# Patient Record
Sex: FEMALE | Race: WHITE | NOT HISPANIC OR LATINO | Employment: OTHER | URBAN - METROPOLITAN AREA
[De-identification: names, ages, dates, MRNs, and addresses within clinical notes are randomized per-mention and may not be internally consistent; named-entity substitution may affect disease eponyms.]

---

## 2021-09-13 ENCOUNTER — HOSPITAL ENCOUNTER (INPATIENT)
Facility: CLINIC | Age: 61
LOS: 3 days | Discharge: HOME | End: 2021-09-16
Attending: EMERGENCY MEDICINE | Admitting: HOSPITALIST
Payer: MEDICAID

## 2021-09-13 DIAGNOSIS — N63.0 BREAST MASS IN FEMALE: ICD-10-CM

## 2021-09-13 DIAGNOSIS — D64.9 ANEMIA, UNSPECIFIED TYPE: ICD-10-CM

## 2021-09-13 LAB
ABO/RH(D): NORMAL
ALBUMIN SERPL-MCNC: 2.4 G/DL (ref 3.4–5)
ALP SERPL-CCNC: 83 U/L (ref 40–150)
ALT SERPL W P-5'-P-CCNC: 21 U/L (ref 0–50)
ANION GAP SERPL CALCULATED.3IONS-SCNC: 7 MMOL/L (ref 3–14)
ANTIBODY SCREEN: NEGATIVE
AST SERPL W P-5'-P-CCNC: 22 U/L (ref 0–45)
BASOPHILS # BLD AUTO: 0 10E3/UL (ref 0–0.2)
BASOPHILS NFR BLD AUTO: 0 %
BILIRUB SERPL-MCNC: 0.3 MG/DL (ref 0.2–1.3)
BLD PROD TYP BPU: NORMAL
BLOOD COMPONENT TYPE: NORMAL
BUN SERPL-MCNC: 10 MG/DL (ref 7–30)
CALCIUM SERPL-MCNC: 9.6 MG/DL (ref 8.5–10.1)
CHLORIDE BLD-SCNC: 103 MMOL/L (ref 94–109)
CO2 SERPL-SCNC: 22 MMOL/L (ref 20–32)
CODING SYSTEM: NORMAL
CREAT SERPL-MCNC: 0.59 MG/DL (ref 0.52–1.04)
CROSSMATCH: NORMAL
EOSINOPHIL # BLD AUTO: 0.1 10E3/UL (ref 0–0.7)
EOSINOPHIL NFR BLD AUTO: 1 %
ERYTHROCYTE [DISTWIDTH] IN BLOOD BY AUTOMATED COUNT: 16.3 % (ref 10–15)
GFR SERPL CREATININE-BSD FRML MDRD: >90 ML/MIN/1.73M2
GLUCOSE BLD-MCNC: 152 MG/DL (ref 70–99)
HCT VFR BLD AUTO: 17.9 % (ref 35–47)
HGB BLD-MCNC: 4.7 G/DL (ref 11.7–15.7)
IMM GRANULOCYTES # BLD: 0 10E3/UL
IMM GRANULOCYTES NFR BLD: 1 %
ISSUE DATE AND TIME: NORMAL
LACTATE SERPL-SCNC: 1.2 MMOL/L (ref 0.7–2)
LYMPHOCYTES # BLD AUTO: 0.7 10E3/UL (ref 0.8–5.3)
LYMPHOCYTES NFR BLD AUTO: 10 %
MCH RBC QN AUTO: 17.9 PG (ref 26.5–33)
MCHC RBC AUTO-ENTMCNC: 26.3 G/DL (ref 31.5–36.5)
MCV RBC AUTO: 68 FL (ref 78–100)
MONOCYTES # BLD AUTO: 0.8 10E3/UL (ref 0–1.3)
MONOCYTES NFR BLD AUTO: 13 %
NEUTROPHILS # BLD AUTO: 5 10E3/UL (ref 1.6–8.3)
NEUTROPHILS NFR BLD AUTO: 75 %
NRBC # BLD AUTO: 0 10E3/UL
NRBC BLD AUTO-RTO: 0 /100
NT-PROBNP SERPL-MCNC: 530 PG/ML (ref 0–900)
PLATELET # BLD AUTO: 576 10E3/UL (ref 150–450)
POTASSIUM BLD-SCNC: 4 MMOL/L (ref 3.4–5.3)
PROT SERPL-MCNC: 6.5 G/DL (ref 6.8–8.8)
RBC # BLD AUTO: 2.63 10E6/UL (ref 3.8–5.2)
SODIUM SERPL-SCNC: 132 MMOL/L (ref 133–144)
SPECIMEN EXPIRATION DATE: NORMAL
TROPONIN I SERPL-MCNC: <0.015 UG/L (ref 0–0.04)
UNIT ABO/RH: NORMAL
UNIT NUMBER: NORMAL
UNIT STATUS: NORMAL
UNIT TYPE ISBT: 6200
WBC # BLD AUTO: 6.6 10E3/UL (ref 4–11)

## 2021-09-13 PROCEDURE — 83605 ASSAY OF LACTIC ACID: CPT | Performed by: EMERGENCY MEDICINE

## 2021-09-13 PROCEDURE — 36415 COLL VENOUS BLD VENIPUNCTURE: CPT | Performed by: EMERGENCY MEDICINE

## 2021-09-13 PROCEDURE — 120N000001 HC R&B MED SURG/OB

## 2021-09-13 PROCEDURE — 83880 ASSAY OF NATRIURETIC PEPTIDE: CPT | Performed by: EMERGENCY MEDICINE

## 2021-09-13 PROCEDURE — 0HQ5XZZ REPAIR CHEST SKIN, EXTERNAL APPROACH: ICD-10-PCS | Performed by: SURGERY

## 2021-09-13 PROCEDURE — 36430 TRANSFUSION BLD/BLD COMPNT: CPT

## 2021-09-13 PROCEDURE — 84484 ASSAY OF TROPONIN QUANT: CPT | Performed by: EMERGENCY MEDICINE

## 2021-09-13 PROCEDURE — 85025 COMPLETE CBC W/AUTO DIFF WBC: CPT | Performed by: EMERGENCY MEDICINE

## 2021-09-13 PROCEDURE — C9803 HOPD COVID-19 SPEC COLLECT: HCPCS

## 2021-09-13 PROCEDURE — 86901 BLOOD TYPING SEROLOGIC RH(D): CPT | Performed by: EMERGENCY MEDICINE

## 2021-09-13 PROCEDURE — P9016 RBC LEUKOCYTES REDUCED: HCPCS | Performed by: EMERGENCY MEDICINE

## 2021-09-13 PROCEDURE — 99291 CRITICAL CARE FIRST HOUR: CPT | Mod: 25

## 2021-09-13 PROCEDURE — 87635 SARS-COV-2 COVID-19 AMP PRB: CPT | Performed by: EMERGENCY MEDICINE

## 2021-09-13 PROCEDURE — 99223 1ST HOSP IP/OBS HIGH 75: CPT | Mod: AI | Performed by: HOSPITALIST

## 2021-09-13 PROCEDURE — 80053 COMPREHEN METABOLIC PANEL: CPT | Performed by: EMERGENCY MEDICINE

## 2021-09-13 PROCEDURE — 86923 COMPATIBILITY TEST ELECTRIC: CPT | Performed by: EMERGENCY MEDICINE

## 2021-09-13 PROCEDURE — 85610 PROTHROMBIN TIME: CPT | Performed by: EMERGENCY MEDICINE

## 2021-09-13 PROCEDURE — 99292 CRITICAL CARE ADDL 30 MIN: CPT | Mod: 25

## 2021-09-13 RX ORDER — IBUPROFEN, ACETAMINOPHEN 250; 125 MG/1; MG/1
1-2 TABLET, FILM COATED ORAL EVERY 6 HOURS PRN
Status: ON HOLD | COMMUNITY
End: 2021-09-16

## 2021-09-13 RX ORDER — HYDROMORPHONE HYDROCHLORIDE 1 MG/ML
0.2 INJECTION, SOLUTION INTRAMUSCULAR; INTRAVENOUS; SUBCUTANEOUS
Status: DISCONTINUED | OUTPATIENT
Start: 2021-09-13 | End: 2021-09-15

## 2021-09-13 ASSESSMENT — MIFFLIN-ST. JEOR: SCORE: 1218.6

## 2021-09-13 ASSESSMENT — ENCOUNTER SYMPTOMS
WEAKNESS: 1
ABDOMINAL DISTENTION: 1

## 2021-09-13 NOTE — ED TRIAGE NOTES
Patient states was at Urgent Care.  Having extreme difficulty with legs.  Thickening with lower legs & going up.  Now going up.  Hx restless leg syndrome.

## 2021-09-14 ENCOUNTER — APPOINTMENT (OUTPATIENT)
Dept: CARDIOLOGY | Facility: CLINIC | Age: 61
End: 2021-09-14
Attending: HOSPITALIST
Payer: MEDICAID

## 2021-09-14 ENCOUNTER — APPOINTMENT (OUTPATIENT)
Dept: CT IMAGING | Facility: CLINIC | Age: 61
End: 2021-09-14
Attending: HOSPITALIST
Payer: MEDICAID

## 2021-09-14 LAB
ALBUMIN SERPL-MCNC: 1.9 G/DL (ref 3.4–5)
ALP SERPL-CCNC: 65 U/L (ref 40–150)
ALT SERPL W P-5'-P-CCNC: 17 U/L (ref 0–50)
ANION GAP SERPL CALCULATED.3IONS-SCNC: 5 MMOL/L (ref 3–14)
AST SERPL W P-5'-P-CCNC: 19 U/L (ref 0–45)
BASOPHILS # BLD AUTO: 0 10E3/UL (ref 0–0.2)
BASOPHILS NFR BLD AUTO: 1 %
BILIRUB SERPL-MCNC: 1.1 MG/DL (ref 0.2–1.3)
BUN SERPL-MCNC: 10 MG/DL (ref 7–30)
CALCIUM SERPL-MCNC: 9 MG/DL (ref 8.5–10.1)
CHLORIDE BLD-SCNC: 108 MMOL/L (ref 94–109)
CO2 SERPL-SCNC: 22 MMOL/L (ref 20–32)
CREAT SERPL-MCNC: 0.46 MG/DL (ref 0.52–1.04)
EOSINOPHIL # BLD AUTO: 0.1 10E3/UL (ref 0–0.7)
EOSINOPHIL NFR BLD AUTO: 1 %
ERYTHROCYTE [DISTWIDTH] IN BLOOD BY AUTOMATED COUNT: 19.9 % (ref 10–15)
FERRITIN SERPL-MCNC: 19 NG/ML (ref 8–252)
GFR SERPL CREATININE-BSD FRML MDRD: >90 ML/MIN/1.73M2
GLUCOSE BLD-MCNC: 106 MG/DL (ref 70–99)
HCT VFR BLD AUTO: 25.2 % (ref 35–47)
HEMOCCULT STL QL: NEGATIVE
HGB BLD-MCNC: 7.9 G/DL (ref 11.7–15.7)
HGB BLD-MCNC: 8 G/DL (ref 11.7–15.7)
HGB BLD-MCNC: 8.3 G/DL (ref 11.7–15.7)
IMM GRANULOCYTES # BLD: 0 10E3/UL
IMM GRANULOCYTES NFR BLD: 0 %
INR PPP: 1.22 (ref 0.85–1.15)
IRON SATN MFR SERPL: 92 % (ref 15–46)
IRON SERPL-MCNC: 312 UG/DL (ref 35–180)
LVEF ECHO: NORMAL
LYMPHOCYTES # BLD AUTO: 0.4 10E3/UL (ref 0.8–5.3)
LYMPHOCYTES NFR BLD AUTO: 6 %
MAGNESIUM SERPL-MCNC: 1.7 MG/DL (ref 1.6–2.3)
MCH RBC QN AUTO: 24.1 PG (ref 26.5–33)
MCHC RBC AUTO-ENTMCNC: 31.7 G/DL (ref 31.5–36.5)
MCV RBC AUTO: 76 FL (ref 78–100)
MONOCYTES # BLD AUTO: 1 10E3/UL (ref 0–1.3)
MONOCYTES NFR BLD AUTO: 13 %
NEUTROPHILS # BLD AUTO: 5.6 10E3/UL (ref 1.6–8.3)
NEUTROPHILS NFR BLD AUTO: 79 %
NRBC # BLD AUTO: 0 10E3/UL
NRBC BLD AUTO-RTO: 0 /100
NT-PROBNP SERPL-MCNC: 538 PG/ML (ref 0–900)
PHOSPHATE SERPL-MCNC: 2.2 MG/DL (ref 2.5–4.5)
PHOSPHATE SERPL-MCNC: 2.6 MG/DL (ref 2.5–4.5)
PLATELET # BLD AUTO: 422 10E3/UL (ref 150–450)
POTASSIUM BLD-SCNC: 4 MMOL/L (ref 3.4–5.3)
PROT SERPL-MCNC: 5.1 G/DL (ref 6.8–8.8)
RBC # BLD AUTO: 3.32 10E6/UL (ref 3.8–5.2)
SARS-COV-2 RNA RESP QL NAA+PROBE: NEGATIVE
SODIUM SERPL-SCNC: 135 MMOL/L (ref 133–144)
TIBC SERPL-MCNC: 338 UG/DL (ref 240–430)
WBC # BLD AUTO: 7.1 10E3/UL (ref 4–11)

## 2021-09-14 PROCEDURE — 36415 COLL VENOUS BLD VENIPUNCTURE: CPT | Performed by: NURSE PRACTITIONER

## 2021-09-14 PROCEDURE — 88305 TISSUE EXAM BY PATHOLOGIST: CPT | Mod: TC | Performed by: SURGERY

## 2021-09-14 PROCEDURE — 74177 CT ABD & PELVIS W/CONTRAST: CPT

## 2021-09-14 PROCEDURE — 84100 ASSAY OF PHOSPHORUS: CPT

## 2021-09-14 PROCEDURE — 250N000013 HC RX MED GY IP 250 OP 250 PS 637: Performed by: HOSPITALIST

## 2021-09-14 PROCEDURE — 88360 TUMOR IMMUNOHISTOCHEM/MANUAL: CPT | Mod: 26

## 2021-09-14 PROCEDURE — 99233 SBSQ HOSP IP/OBS HIGH 50: CPT | Performed by: HOSPITALIST

## 2021-09-14 PROCEDURE — 85025 COMPLETE CBC W/AUTO DIFF WBC: CPT | Performed by: HOSPITALIST

## 2021-09-14 PROCEDURE — 82272 OCCULT BLD FECES 1-3 TESTS: CPT | Performed by: HOSPITALIST

## 2021-09-14 PROCEDURE — 93308 TTE F-UP OR LMTD: CPT | Mod: 26 | Performed by: INTERNAL MEDICINE

## 2021-09-14 PROCEDURE — 84100 ASSAY OF PHOSPHORUS: CPT | Performed by: NURSE PRACTITIONER

## 2021-09-14 PROCEDURE — 99222 1ST HOSP IP/OBS MODERATE 55: CPT | Performed by: SURGERY

## 2021-09-14 PROCEDURE — 250N000013 HC RX MED GY IP 250 OP 250 PS 637: Performed by: INTERNAL MEDICINE

## 2021-09-14 PROCEDURE — 120N000001 HC R&B MED SURG/OB

## 2021-09-14 PROCEDURE — 88377 M/PHMTRC ALYS ISHQUANT/SEMIQ: CPT | Performed by: SURGERY

## 2021-09-14 PROCEDURE — 88341 IMHCHEM/IMCYTCHM EA ADD ANTB: CPT | Mod: 26

## 2021-09-14 PROCEDURE — 83550 IRON BINDING TEST: CPT | Performed by: HOSPITALIST

## 2021-09-14 PROCEDURE — 93005 ELECTROCARDIOGRAM TRACING: CPT

## 2021-09-14 PROCEDURE — 36415 COLL VENOUS BLD VENIPUNCTURE: CPT | Performed by: HOSPITALIST

## 2021-09-14 PROCEDURE — 250N000011 HC RX IP 250 OP 636: Performed by: HOSPITALIST

## 2021-09-14 PROCEDURE — 93325 DOPPLER ECHO COLOR FLOW MAPG: CPT

## 2021-09-14 PROCEDURE — 88305 TISSUE EXAM BY PATHOLOGIST: CPT | Mod: 26

## 2021-09-14 PROCEDURE — 250N000009 HC RX 250: Performed by: HOSPITALIST

## 2021-09-14 PROCEDURE — 93325 DOPPLER ECHO COLOR FLOW MAPG: CPT | Mod: 26 | Performed by: INTERNAL MEDICINE

## 2021-09-14 PROCEDURE — 84155 ASSAY OF PROTEIN SERUM: CPT | Performed by: HOSPITALIST

## 2021-09-14 PROCEDURE — 99222 1ST HOSP IP/OBS MODERATE 55: CPT | Performed by: INTERNAL MEDICINE

## 2021-09-14 PROCEDURE — 85018 HEMOGLOBIN: CPT | Performed by: HOSPITALIST

## 2021-09-14 PROCEDURE — 99207 PR NO CHARGE LOS: CPT | Performed by: NURSE PRACTITIONER

## 2021-09-14 PROCEDURE — P9016 RBC LEUKOCYTES REDUCED: HCPCS | Performed by: EMERGENCY MEDICINE

## 2021-09-14 PROCEDURE — 83880 ASSAY OF NATRIURETIC PEPTIDE: CPT | Performed by: HOSPITALIST

## 2021-09-14 PROCEDURE — 93321 DOPPLER ECHO F-UP/LMTD STD: CPT | Mod: 26 | Performed by: INTERNAL MEDICINE

## 2021-09-14 PROCEDURE — 0HB5XZX EXCISION OF CHEST SKIN, EXTERNAL APPROACH, DIAGNOSTIC: ICD-10-PCS | Performed by: SURGERY

## 2021-09-14 PROCEDURE — 88342 IMHCHEM/IMCYTCHM 1ST ANTB: CPT | Mod: 26

## 2021-09-14 PROCEDURE — 82947 ASSAY GLUCOSE BLOOD QUANT: CPT | Performed by: HOSPITALIST

## 2021-09-14 PROCEDURE — 93308 TTE F-UP OR LMTD: CPT

## 2021-09-14 PROCEDURE — 83735 ASSAY OF MAGNESIUM: CPT

## 2021-09-14 PROCEDURE — 82728 ASSAY OF FERRITIN: CPT | Performed by: INTERNAL MEDICINE

## 2021-09-14 PROCEDURE — 88377 M/PHMTRC ALYS ISHQUANT/SEMIQ: CPT | Mod: 26 | Performed by: MEDICAL GENETICS

## 2021-09-14 RX ORDER — PROCHLORPERAZINE 25 MG
25 SUPPOSITORY, RECTAL RECTAL EVERY 12 HOURS PRN
Status: DISCONTINUED | OUTPATIENT
Start: 2021-09-14 | End: 2021-09-16 | Stop reason: HOSPADM

## 2021-09-14 RX ORDER — NALOXONE HYDROCHLORIDE 0.4 MG/ML
0.2 INJECTION, SOLUTION INTRAMUSCULAR; INTRAVENOUS; SUBCUTANEOUS
Status: DISCONTINUED | OUTPATIENT
Start: 2021-09-14 | End: 2021-09-16 | Stop reason: HOSPADM

## 2021-09-14 RX ORDER — IOPAMIDOL 755 MG/ML
73 INJECTION, SOLUTION INTRAVASCULAR ONCE
Status: COMPLETED | OUTPATIENT
Start: 2021-09-14 | End: 2021-09-14

## 2021-09-14 RX ORDER — CYCLOBENZAPRINE HCL 5 MG
5 TABLET ORAL 2 TIMES DAILY PRN
Status: DISCONTINUED | OUTPATIENT
Start: 2021-09-14 | End: 2021-09-16 | Stop reason: HOSPADM

## 2021-09-14 RX ORDER — PROCHLORPERAZINE MALEATE 10 MG
10 TABLET ORAL EVERY 6 HOURS PRN
Status: DISCONTINUED | OUTPATIENT
Start: 2021-09-14 | End: 2021-09-16 | Stop reason: HOSPADM

## 2021-09-14 RX ORDER — FERROUS SULFATE 325(65) MG
325 TABLET ORAL DAILY
Status: DISCONTINUED | OUTPATIENT
Start: 2021-09-14 | End: 2021-09-16 | Stop reason: HOSPADM

## 2021-09-14 RX ORDER — FENTANYL CITRATE 50 UG/ML
100 INJECTION, SOLUTION INTRAMUSCULAR; INTRAVENOUS ONCE
Status: DISCONTINUED | OUTPATIENT
Start: 2021-09-14 | End: 2021-09-14

## 2021-09-14 RX ORDER — ACETAMINOPHEN 650 MG/1
650 SUPPOSITORY RECTAL EVERY 6 HOURS PRN
Status: DISCONTINUED | OUTPATIENT
Start: 2021-09-14 | End: 2021-09-16 | Stop reason: HOSPADM

## 2021-09-14 RX ORDER — LIDOCAINE 40 MG/G
CREAM TOPICAL
Status: DISCONTINUED | OUTPATIENT
Start: 2021-09-14 | End: 2021-09-16 | Stop reason: HOSPADM

## 2021-09-14 RX ORDER — NALOXONE HYDROCHLORIDE 0.4 MG/ML
0.4 INJECTION, SOLUTION INTRAMUSCULAR; INTRAVENOUS; SUBCUTANEOUS
Status: DISCONTINUED | OUTPATIENT
Start: 2021-09-14 | End: 2021-09-16 | Stop reason: HOSPADM

## 2021-09-14 RX ORDER — ONDANSETRON 2 MG/ML
4 INJECTION INTRAMUSCULAR; INTRAVENOUS EVERY 6 HOURS PRN
Status: DISCONTINUED | OUTPATIENT
Start: 2021-09-14 | End: 2021-09-16 | Stop reason: HOSPADM

## 2021-09-14 RX ORDER — ACETAMINOPHEN 325 MG/1
650 TABLET ORAL EVERY 6 HOURS PRN
Status: DISCONTINUED | OUTPATIENT
Start: 2021-09-14 | End: 2021-09-16 | Stop reason: HOSPADM

## 2021-09-14 RX ORDER — BISACODYL 10 MG
10 SUPPOSITORY, RECTAL RECTAL DAILY PRN
Status: DISCONTINUED | OUTPATIENT
Start: 2021-09-14 | End: 2021-09-16 | Stop reason: HOSPADM

## 2021-09-14 RX ORDER — POLYETHYLENE GLYCOL 3350 17 G/17G
17 POWDER, FOR SOLUTION ORAL DAILY PRN
Status: DISCONTINUED | OUTPATIENT
Start: 2021-09-14 | End: 2021-09-16 | Stop reason: HOSPADM

## 2021-09-14 RX ORDER — FENTANYL CITRATE 50 UG/ML
50 INJECTION, SOLUTION INTRAMUSCULAR; INTRAVENOUS ONCE
Status: DISCONTINUED | OUTPATIENT
Start: 2021-09-14 | End: 2021-09-16 | Stop reason: HOSPADM

## 2021-09-14 RX ORDER — ONDANSETRON 4 MG/1
4 TABLET, ORALLY DISINTEGRATING ORAL EVERY 6 HOURS PRN
Status: DISCONTINUED | OUTPATIENT
Start: 2021-09-14 | End: 2021-09-16 | Stop reason: HOSPADM

## 2021-09-14 RX ORDER — FENTANYL CITRATE 50 UG/ML
INJECTION, SOLUTION INTRAMUSCULAR; INTRAVENOUS
Status: DISCONTINUED
Start: 2021-09-14 | End: 2021-09-14 | Stop reason: HOSPADM

## 2021-09-14 RX ORDER — ROPINIROLE 0.25 MG/1
0.25 TABLET, FILM COATED ORAL ONCE
Status: COMPLETED | OUTPATIENT
Start: 2021-09-14 | End: 2021-09-14

## 2021-09-14 RX ADMIN — FERROUS SULFATE TAB 325 MG (65 MG ELEMENTAL FE) 325 MG: 325 (65 FE) TAB at 15:10

## 2021-09-14 RX ADMIN — CYCLOBENZAPRINE 5 MG: 5 TABLET, FILM COATED ORAL at 12:19

## 2021-09-14 RX ADMIN — ACETAMINOPHEN 650 MG: 325 TABLET, FILM COATED ORAL at 15:10

## 2021-09-14 RX ADMIN — SODIUM CHLORIDE 62 ML: 9 INJECTION, SOLUTION INTRAVENOUS at 14:19

## 2021-09-14 RX ADMIN — POTASSIUM & SODIUM PHOSPHATES POWDER PACK 280-160-250 MG 1 PACKET: 280-160-250 PACK at 21:26

## 2021-09-14 RX ADMIN — IOPAMIDOL 73 ML: 755 INJECTION, SOLUTION INTRAVENOUS at 14:19

## 2021-09-14 RX ADMIN — ROPINIROLE HYDROCHLORIDE 0.25 MG: 0.25 TABLET, FILM COATED ORAL at 02:57

## 2021-09-14 RX ADMIN — ACETAMINOPHEN 650 MG: 325 TABLET, FILM COATED ORAL at 08:48

## 2021-09-14 RX ADMIN — ACETAMINOPHEN 650 MG: 325 TABLET, FILM COATED ORAL at 21:26

## 2021-09-14 RX ADMIN — POTASSIUM & SODIUM PHOSPHATES POWDER PACK 280-160-250 MG 1 PACKET: 280-160-250 PACK at 15:10

## 2021-09-14 RX ADMIN — CYCLOBENZAPRINE 5 MG: 5 TABLET, FILM COATED ORAL at 21:26

## 2021-09-14 ASSESSMENT — ACTIVITIES OF DAILY LIVING (ADL)
DRESSING/BATHING_DIFFICULTY: NO
DOING_ERRANDS_INDEPENDENTLY_DIFFICULTY: NO
ADLS_ACUITY_SCORE: 17
NUMBER_OF_TIMES_PATIENT_HAS_FALLEN_WITHIN_LAST_SIX_MONTHS: 2
DIFFICULTY_EATING/SWALLOWING: NO
FALL_HISTORY_WITHIN_LAST_SIX_MONTHS: YES
WALKING_OR_CLIMBING_STAIRS_DIFFICULTY: YES
CONCENTRATING,_REMEMBERING_OR_MAKING_DECISIONS_DIFFICULTY: NO
HEARING_DIFFICULTY_OR_DEAF: NO
WHICH_OF_THE_ABOVE_FUNCTIONAL_RISKS_HAD_A_RECENT_ONSET_OR_CHANGE?: FALL HISTORY
ADLS_ACUITY_SCORE: 17
TOILETING_ISSUES: NO
EQUIPMENT_CURRENTLY_USED_AT_HOME: WALKER, ROLLING
VISION_MANAGEMENT: GLASSES
WALKING_OR_CLIMBING_STAIRS: AMBULATION DIFFICULTY, REQUIRES EQUIPMENT
DIFFICULTY_COMMUNICATING: NO
PATIENT_/_FAMILY_COMMUNICATION_STYLE: SPOKEN LANGUAGE (ENGLISH OR BILINGUAL)
ADLS_ACUITY_SCORE: 17
ADLS_ACUITY_SCORE: 18
WERE_AUXILIARY_AIDS_OFFERED?: NO
ADLS_ACUITY_SCORE: 17
WEAR_GLASSES_OR_BLIND: YES

## 2021-09-14 NOTE — PLAN OF CARE
[id number]: Patient alert and oriented, VSS on room air, tele SR. Complains of pain in L breast, prn tylenol effective. L breast wound covered with abd pads, binder in place. Serosanguinous drainage noted, odorous. Pt up independently. Monitoring hemoglobin, last result 8.3. Beside biopsy done today, result pending. Plan for bone scan tomorrow.

## 2021-09-14 NOTE — CONSULTS
Care Management Initial Consult    General Information  Assessment completed with: Susie Nicholas   Type of CM/SW Visit: Initial Assessment    Primary Care Provider verified and updated as needed:     Readmission within the last 30 days: no previous admission in last 30 days      Reason for Consult: financial concerns  Advance Care Planning: Advance Care Planning Reviewed: other (comment) (No docs )          Communication Assessment  Patient's communication style: spoken language (English or Bilingual)    Hearing Difficulty or Deaf: no   Wear Glasses or Blind: yes    Cognitive  Cognitive/Neuro/Behavioral: WDL  Level of Consciousness: alert  Arousal Level: opens eyes spontaneously  Orientation: oriented x 4  Mood/Behavior: calm, cooperative  Best Language: 0 - No aphasia  Speech: clear, spontaneous    Living Environment:   People in home: spouse     Current living Arrangements: apartment      Able to return to prior arrangements:         Family/Social Support:  Care provided by: self  Provides care for: no one  Marital Status:   , Children  Tyler       Description of Support System: Involved, Supportive         Current Resources:   Patient receiving home care services: No     Community Resources: None  Equipment currently used at home: walker, rolling  Supplies currently used at home: None    Employment/Financial:  Employment Status: retired (Pt just retired and reports she would now receieve social [employer])     Employment/ Comments: No  Financial Concerns: other (see comments) (Reports she would pay out-of-pocket for all medical services)   Referral to Financial Counselor: No (Pt declining FC referral currently )  Finance Comments: Pt reports she will pay out-of-pocket and she is waiting for insurance that she has applied for      Lifestyle & Psychosocial Needs:  Social Determinants of Health     Tobacco Use: Unknown     Smoking Tobacco Use: Never Smoker     Smokeless Tobacco Use: Unknown    Alcohol Use:      Frequency of Alcohol Consumption:      Average Number of Drinks:      Frequency of Binge Drinking:    Financial Resource Strain:      Difficulty of Paying Living Expenses:    Food Insecurity:      Worried About Running Out of Food in the Last Year:      Ran Out of Food in the Last Year:    Transportation Needs:      Lack of Transportation (Medical):      Lack of Transportation (Non-Medical):    Physical Activity:      Days of Exercise per Week:      Minutes of Exercise per Session:    Stress:      Feeling of Stress :    Social Connections:      Frequency of Communication with Friends and Family:      Frequency of Social Gatherings with Friends and Family:      Attends Sikhism Services:      Active Member of Clubs or Organizations:      Attends Club or Organization Meetings:      Marital Status:    Intimate Partner Violence:      Fear of Current or Ex-Partner:      Emotionally Abused:      Physically Abused:      Sexually Abused:    Depression:      PHQ-2 Score:    Housing Stability:      Unable to Pay for Housing in the Last Year:      Number of Places Lived in the Last Year:      Unstable Housing in the Last Year:        Functional Status:  Prior to admission patient needed assistance:              Mental Health Status:          Chemical Dependency Status:                Values/Beliefs:  Spiritual, Cultural Beliefs, Sikhism Practices, Values that affect care: no               Additional Information:  SW discussed with pt regarding her lack of insurance. Pt reports she is taking responsibility for her medical bills because she is waiting on insurance from her social security. She reports she will have insurance in two weeks. Pt will currently pay out-of-pocket for her medical bills. Pt has no concerns for paying for her medical bills.     JUAN DAVID Tompkins

## 2021-09-14 NOTE — PROGRESS NOTES
General surgery note     Patient anemic due to bleeding from skin edges of left breast fungating  mass.   This was controlled with sutures. So that patient could have a somewhat more formal evaluation, as even palliative type procedures may prove challenging.

## 2021-09-14 NOTE — PHARMACY-ADMISSION MEDICATION HISTORY
Pharmacy Medication History  Admission medication history interview status for the 9/13/2021  admission is complete. See EPIC admission navigator for prior to admission medications     Location of Interview: Patient room  Medication history sources: Patient, Surescripts and Care Everywhere    Significant changes made to the medication list:  Added all meds    In the past week, patient estimated taking medication this percent of the time: greater than 90%    Additional medication history information:   none    Medication reconciliation completed by provider prior to medication history? No    Time spent in this activity: 5 mins    Prior to Admission medications    Medication Sig Last Dose Taking? Auth Provider   Ibuprofen-Acetaminophen (ADVIL DUAL ACTION) 125-250 MG TABS Take 1-2 tablets by mouth every 6 hours as needed  Yes Unknown, Entered By History       The information provided in this note is only as accurate as the sources available at the time of update(s)

## 2021-09-14 NOTE — PROVIDER NOTIFICATION
MD Notification    Notified Person: MD    Notified Person Name: Opal    Notification Date/Time: 9/14/2021 0300    Notification Interaction: Paged    Purpose of Notification:    Dressing has moderated amount of drainage    Orders Received:    Reinforce dressing with a towel or ABD pad and abdominal binder to help hold pressure     Comments:     Dressing reinforced

## 2021-09-14 NOTE — ED NOTES
DATE:  9/13/2021   TIME OF RECEIPT FROM LAB:  2220  LAB TEST:  Hemoglobin   LAB VALUE:  4.7  RESULTS GIVEN WITH READ-BACK TO (PROVIDER):  Shawna Cuellar MD  TIME LAB VALUE REPORTED TO PROVIDER:   2220

## 2021-09-14 NOTE — ED NOTES
Improved color and mentation after 1st unit of blood. Chest dressing patent; however, there is still a slow flow of blood draining. MD aware.

## 2021-09-14 NOTE — PROGRESS NOTES
[facility]  Hospitalist Progress Note   09/14/2021          Assessment and Plan:       Susie Hernández is a 61 year old woman with restless leg syndrome admitted on 9/13/2021 with leg swelling and fatigue and found to have blood loss anemia      Severe acute symptomatic blood loss anemia suspected due to fungating left breast mass:  Reports oozing from breast mass.  Tachycardic, hemoglobin 4.7.  Received 4 units of blood transfusion.  General surgery following noted to have bleeding from skin edges of left breast fungating mass that was controlled with Sutures.  Will hold off anemia work-up as patient has already received blood transfusion.  Fecal occult blood testing.  CT abdomen and pelvis with and without contrast pending.  [facility] consulted.  Appreciate input.  Monitor hemoglobin levels every 8 hours  Transfuse if hemoglobin less than 7 or symptomatic -conditional order to transfuse placed.    Fungating left breast mass.  Patient reports mass on left breast ongoing for more than a year.  No history of cancer in family members.  No previous age-appropriate health maintenance.  Oncology consulted.  General surgery following, plan for bedside punch biopsy for tissue diagnosis.  Appreciate input.  WOC RN consult   As needed pain meds as needed for pain.    Leg and abdominal distension:  Reports  bilateral lower extremity swelling for few weeks now.  This could be due to compression from as yet occult intra-abdominal mass, vs acute high output diastolic CHF exacerbation due to anemia, vs due to hypoalbuminemia.  TTE pending   As needed IV/p.o. Lasix.     Acute hyponatremia: Mild, 132. Suspect due to hypovolemia, vs SIADH from malignancy.   Monitor closely for now.     Suspected severe protein calorie malnutrition: Hypoalbuminemia from poor oral intake.  Presented with albumin of 2.4.  Nutrition services consulted.     RLS:   Started on trial of Requip [9/13], symptoms improving.  Continues  to complain of intermittent leg cramps, as needed Flexeril ordered.    COVID-19 PCR negative on admission.  Not vaccinated for COVID-19, encouraged to consider vaccination.      Orders Placed This Encounter      NPO for Medical/Clinical Reasons Except for: Meds, Ice Chips      DVT Prophylaxis: SCDs, ambulate.    Code Status: Full Code  Disposition: Expected discharge in greater than 2 days pending clinical improvement.    Discussed with patient, bedside RN, general surgery.  Total time greater than 35 minutes.  More than 60% of time spent in direct patient care, care coordination, patient counseling, and formalizing plan of care.     Miranda Mcdermott MD        Interval History:      Lying in bed.  Continues to complain of discomfort in her left breast area.  Oozing drainage, abdominal binder in place.  Complaining of restless legs.  Some relief of symptoms with Requip overnight.  Denies any chest pain.  Denies any headache or dizziness.  No palpitations.  No nausea vomiting.         Physical Exam:        Physical Exam   Temp:  [96.9  F (36.1  C)-98.6  F (37  C)] 98.3  F (36.8  C)  Pulse:  [] 79  Resp:  [8-27] 16  BP: (101-136)/(59-96) 110/67  SpO2:  [96 %-100 %] 100 %    Intake/Output Summary (Last 24 hours) at 9/14/2021 1335  Last data filed at 9/14/2021 0850  Gross per 24 hour   Intake 1330 ml   Output 200 ml   Net 1130 ml       Admission Weight: 65.8 kg (145 lb)  Current Weight: 65.8 kg (145 lb)    PHYSICAL EXAM  GENERAL: Patient is in no distress. Alert and oriented.  HEART: Regular rate and rhythm. S1S2.  Technically difficult due to breast mass.  Chest wall left breast mass fungating, oozing discharge.  LUNGS: Clear to auscultation bilaterally. No expiratory wheeze.  Respirations unlabored  ABDOMEN: Soft, no abdominal tenderness, bowel sounds heard   NEURO: Moving all extremities.  EXTREMITIES: No pedal edema.   SKIN: Warm, dry.   PSYCHIATRY Cooperative       Medications:          fentaNYL  50 mcg  Intravenous Once     ferrous sulfate  325 mg Oral Daily     potassium & sodium phosphates  1 packet Oral TID     sodium chloride (PF)  3 mL Intracatheter Q8H     acetaminophen **OR** acetaminophen, bisacodyl, cyclobenzaprine, HYDROmorphone, lidocaine 4%, lidocaine (buffered or not buffered), melatonin, naloxone **OR** naloxone **OR** naloxone **OR** naloxone, ondansetron **OR** ondansetron, polyethylene glycol, prochlorperazine **OR** prochlorperazine **OR** prochlorperazine, sodium chloride (PF)         Data:      All new lab and imaging data was reviewed.

## 2021-09-14 NOTE — PLAN OF CARE
A&Ox4, restless at times d/t restless leg syndrome,1 time dose of Requip given with improvement. VSS ex. Tachycardic improving with blood transfusion. Tele: ST. Denies pain. NPO since midnight. Denies nausea. Bedrest. L chest dressing, moderate amount of drainage, abdominal binder applied to help hold pressure on wound. Voiding and 1 BM via bedpan. Last unit of blood transfusing ( 4 units given in total). Day RN to re time hemoglobin check. Continue to monitor.

## 2021-09-14 NOTE — CONSULTS
[facility] General Surgery Consultation    Susie Hernández MRN# [id number]   YOB: 1960 Age: 61 year old      Date of Admission:  9/13/2021  Date of Consult: 9/14/2021         Assessment and Plan:   Patient is a 61 year old female with fungating left breast mass    PLAN:  -Medical management per primary team  -Follow-up recommendations from oncology consult  -Proceed with CT of the chest/abdomen/pelvis to evaluate for metastatic disease  -Will obtain bedside punch biopsy for tissue diagnosis  -No indication for further surgical intervention at this time.  Surgery to follow.         Requesting Physician:              Chief Complaint:     Chief Complaint   Patient presents with     Leg Swelling          History of Present Illness:   Susie Hernández is a 61 year old female who was seen in consultation at the request of  who presented with leg swelling.  The patient reports that she first noticed a small mass measuring a couple of inches in length present over the lower inner aspect of the left breast approximately 2 years ago.  She states that the mass then completely resolved.  Approximately 1 year later, the patient noticed a recurrent left breast mass in the same location.  Over the last 6 months, this mass has significantly increased in size and is now encompassing the patient's entire left breast.  The patient reports that she has been doing twice daily dressing changes for her resulting left breast wound.  The patient denies any significant bleeding associated with her left breast wound.  She did have some bleeding from the wound last night, and stitches were placed to obtain hemostasis by one of my surgical partners.  The patient reports that she has not undergone any previous mammograms.  No previous breast biopsies.  No family history of breast cancer.  She began menarche at the age of 11.  Her first pregnancy was at age 24.  Patient denies use of hormone  "replacement therapy.  She was on oral contraceptives for approximately 10 years.              Physical Exam:   Blood pressure 113/64, pulse 89, temperature 97.8  F (36.6  C), temperature source Oral, resp. rate 17, height 1.651 m (5' 5\"), weight 65.8 kg (145 lb), SpO2 96 %.  145 lbs 0 oz  General: Vital signs reviewed, in no apparent distress  Eyes: Anicteric  HENT: Normocephalic, atraumatic, trachea midline   Respiratory: Breathing nonlabored  Cardiovascular: Regular rate and rhythm  Breast: Large, necrotic, fungating, malodorous mass encompassing the entire left breast; masslike firmness involving the central aspect of the right breast  Lymph: Bulky left axillary lymphadenopathy, no obvious right axillary lymphadenopathy  Musculoskeletal: No gross deformities  Neurologic: Grossly nonfocal exam  Psychiatric: Normal mood, affect and insight  Integumentary: Warm and dry         Past Medical History:     Past Medical History:   Diagnosis Date     Restless legs syndrome (RLS)             Past Surgical History:     Past Surgical History:   Procedure Laterality Date     CHOLECYSTECTOMY  1987            Current Medications:           fentaNYL  50 mcg Intravenous Once     sodium chloride (PF)  3 mL Intracatheter Q8H       acetaminophen **OR** acetaminophen, bisacodyl, HYDROmorphone, lidocaine 4%, lidocaine (buffered or not buffered), melatonin, naloxone **OR** naloxone **OR** naloxone **OR** naloxone, ondansetron **OR** ondansetron, polyethylene glycol, prochlorperazine **OR** prochlorperazine **OR** prochlorperazine, sodium chloride (PF)         Home Medications:     Prior to Admission medications    Medication Sig Last Dose Taking? Auth Provider   Ibuprofen-Acetaminophen (ADVIL DUAL ACTION) 125-250 MG TABS Take 1-2 tablets by mouth every 6 hours as needed  Yes Unknown, Entered By History            Allergies:   No Known Allergies         Family History:     Family History   Problem Relation Age of Onset     Myocardial " Infarction Father 40     Cancer No family hx of            Social History:   Susie Hernández  reports that she has never smoked. She does not have any smokeless tobacco history on file. She reports previous alcohol use.          Review of Systems:   Constitutional: No fevers or chills  Eyes: No blurred or double vision  HENT: Denies headaches, No rhinorrhea, No sore throat  Respiratory: Cough  Cardiovascular: Denies chest pain or palpitations  Gastrointestinal: No abdominal pain or nausea/vomiting  Genitourinary: No hematuria or dysuria  Musculoskeletal: Denies arthralgias or myalgias  Neurologic: Numbness/tingling in lower extremity  Integumentary: No skin rashes         Labs/Imaging   All new lab and imaging data was reviewed.   Recent Labs   Lab 09/14/21  0930 09/13/21  2150   WBC 7.1 6.6   HGB 8.0* 4.7*   HCT 25.2* 17.9*   MCV 76* 68*    576*     Recent Labs   Lab 09/14/21 0930 09/13/21  2150    132*   POTASSIUM 4.0 4.0   CHLORIDE 108 103   CO2 22 22   ANIONGAP 5 7   * 152*   BUN 10 10   CR 0.46* 0.59   GFRESTIMATED >90 >90   CHYNA 9.0 9.6   MAG 1.7  --    PHOS 2.2*  --    PROTTOTAL 5.1* 6.5*   ALBUMIN 1.9* 2.4*   BILITOTAL 1.1 0.3   ALKPHOS 65 83   AST 19 22   ALT 17 21       Razia Godwin MD

## 2021-09-14 NOTE — PROVIDER NOTIFICATION
MD Notification    Notified Person: MD    Notified Person Name: mansoor    Notification Date/Time: 9/14/2021 0319    Notification Interaction: Amcom    Purpose of Notification:    330-1 S.T.    FYI    cardiac murmur noted on assessment    Rhianna Gonzales RN    Orders Received:    No new orders    Comments:

## 2021-09-14 NOTE — H&P
[facility]    History and Physical  Hospitalist       Date of Admission:  9/13/2021  Date of Service (when I saw the patient): 09/13/21    ASSESSMENT  Susie Hernádnez is a markedly pleasant 61 year old woman with past medical history that is most notable for restless leg syndrome who presents with leg swelling and fatigue and is found to have acute blood loss anemia causing syncope suspected due to fungating left breast mass.    PLAN    Severe acute blood loss anemia causing syncope suspected due to fungating left breast mass: the mass continues to ooze blood. She is tachycardic though not hypotensive. Suspect as yet undiagnosed breast cancer. HGB is 4.7. Four units packed red cells have been ordered in the ED and Surgery has been consulted.     -- Inpatient. Telemetry. NPO. Follow up Surgery recommendations closely tonight. HGB checks q 4 hours.     -- Oncology consulted. CT Chest, abdomen and pelvis ordered.    -- Tylenol, anti-emetics as needed    Leg and abdominal distension: This could be due to compression from as yet occult intra-abdominal mass, vs acute high output diastolic CHF exacerbation due to anemia, vs due to hypoalbuminemia.    -- TTE and EKG ordered    -- For any acute dyspnea especially while she is being transfused, would order a careful trial of Lasix    Acute hyponatremia: Mild, 132. Suspect due to hypovolemia, vs SIADH from malignancy. Monitor closely for now.    Suspected severe protein calorie malnutrition: Nutrition services consulted.    RLS: Will try Requip for relief of symptoms once bleeding has stabilized    Rule Out COVID-19 infection  This patient was evaluated during a global COVID-19 pandemic, which necessitated consideration that the patient might be at risk for infection with the SARS-CoV-2 virus that causes COVID-19. Applicable protocols for evaluation were followed during the patient's care. Low suspicion for infection.   -follow up COVID-19 PCR test  "result  -no current indication for precautions    Chief Complaint   Leg swelling    History is obtained from the patient and the ED physician whom I have spoken with    History of Present Illness   Susie Hernández is a markedly pleasant 61 year old woman who presents with leg swelling. This started in the left leg (also the right leg), in the ankles about two weeks ago; the night before it started she had eaten some seafood. The swelling has persisted since onset and spread up the lower legs, and she also feels distension in her abdomen. She has noticed during this time frame worsening of her chronic night time restless legs, as well as fatigue. Her  has gotten her a walker to use with walking because she has to stabilize herself to walk due to the restless legs. At times she has also noticed an associated cough at night that is non-productive and comes on after she has fallen asleep, waking her up. She was seen in an urgent care center and sent in to the ED for further evaluation. In addition, she adds that two years ago, she noticed development of a lump in her left breast that was small, maybe 2 cm or so in size, that resolved on its own. However a year after that she then developed a mass in the left breast that started small and over the last six months has come to spread over the whole breast; it is an open wound she has been dressing herself at home, though she feels it has not been bleeding at home. Due to COVID and the fact she has just retired and has switched insurance carriers she has not yet sought medical attention for it. She otherwise denies any family history of breast or any other cancer, or any dyspnea, weight change, fever, chills, or sweats, or any pain in the breast mass or abdomen or chest, or any other acute complaints.    In the ED, Blood pressure 113/70, pulse 104, temperature 97.6  F (36.4  C), temperature source Temporal, resp. rate 16, height 1.651 m (5' 5\"), weight 65.8 kg (145 " "lb), SpO2 99 %.    CBC and CMP were notable for Na 132, alb 2.4, tprot 6.5, Glucose 152, HGB 4.7, MCV 68, , otherwise were within the normal reference range. BNP was 530, Troponin negative.    While in the ED she developed syncope while walking; 4 units packed red cells have been ordered for urgent transfusion.    PHYSICAL EXAM  Blood pressure 113/70, pulse 104, temperature 97.6  F (36.4  C), temperature source Temporal, resp. rate 16, height 1.651 m (5' 5\"), weight 65.8 kg (145 lb), SpO2 99 %.  Constitutional: Alert and oriented to person, place and time; no apparent distress  HEENT: normocephalic moist mucus membranes  Respiratory: lungs clear to auscultation bilaterally  Cardiovascular: regular S1 S2  GI: abdomen soft non tender non distended bowel sounds positive  Lymph/Hematologic: marked pallor, no evident cervical lymphadenopathy that I can palpate  Skin: extensive fungating mass over the left breast region, oozing blood (see ED provider note for pictures); good turgor  Musculoskeletal: mild to moderate bilateral LE edema  Neurologic: extra-ocular muscles intact; moves all four extremities  Psychiatric: appropriate affect, insight and judgment     DVT Prophylaxis: Pneumatic Compression Devices  Code Status: Full Code    Disposition: Expected discharge in 2-5 days    Harman Irvin MD    Past Medical History    I have reviewed this patient's medical history and updated it with pertinent information if needed.   Past Medical History:   Diagnosis Date     Restless legs syndrome (RLS)        Past Surgical History   I have reviewed this patient's surgical history and updated it with pertinent information if needed.  Past Surgical History:   Procedure Laterality Date     CHOLECYSTECTOMY  1987       Prior to Admission Medications   Prior to Admission Medications   Prescriptions Last Dose Informant Patient Reported? Taking?   Ibuprofen-Acetaminophen (ADVIL DUAL ACTION) 125-250 MG TABS  Self Yes Yes "   Sig: Take 1-2 tablets by mouth every 6 hours as needed      Facility-Administered Medications: None     Allergies   No Known Allergies    Social History   I have reviewed this patient's social history and updated it with pertinent information if needed. Susie Hernández  reports that she has never smoked. She does not have any smokeless tobacco history on file. She reports previous alcohol use.    Family History   Family history assessed and, except as above, is non-contributory.    Family History   Problem Relation Age of Onset     Myocardial Infarction Father 40     Cancer No family hx of        Review of Systems   The 10 point Review of Systems is negative other than noted in the HPI or here.     Primary Care Physician   Kenneth Lentz [facility]    Data   Labs Ordered and Resulted from Time of ED Arrival Up to the Time of Departure from the ED   COMPREHENSIVE METABOLIC PANEL - Abnormal; Notable for the following components:       Result Value    Sodium 132 (*)     Glucose 152 (*)     Protein Total 6.5 (*)     Albumin 2.4 (*)     All other components within normal limits   CBC WITH PLATELETS AND DIFFERENTIAL - Abnormal; Notable for the following components:    RBC Count 2.63 (*)     Hemoglobin 4.7 (*)     Hematocrit 17.9 (*)     MCV 68 (*)     MCH 17.9 (*)     MCHC 26.3 (*)     RDW 16.3 (*)     Platelet Count 576 (*)     Absolute Lymphocytes 0.7 (*)     All other components within normal limits   LACTIC ACID WHOLE BLOOD - Normal   TROPONIN I - Normal   NT PROBNP INPATIENT - Normal   CBC WITH PLATELETS & DIFFERENTIAL    Narrative:     The following orders were created for panel order CBC with platelets differential.  Procedure                               Abnormality         Status                     ---------                               -----------         ------                     CBC with platelets and d...[[id number]]  Abnormal            Final result                 Please view results for these tests on the  individual orders.   TYPE AND SCREEN, ADULT   PREPARE RED BLOOD CELLS (UNIT)   PREPARE RED BLOOD CELLS (UNIT)   PREPARE RED BLOOD CELLS (UNIT)   PREPARE RED BLOOD CELLS (UNIT)   PREPARE RED BLOOD CELLS (UNIT)   PREPARE RED BLOOD CELLS (UNIT)   TRANSFUSE RED BLOOD CELLS (UNIT)   ABO/RH TYPE AND SCREEN    Narrative:     The following orders were created for panel order ABO/Rh type and screen.  Procedure                               Abnormality         Status                     ---------                               -----------         ------                     Adult Type and Screen[[id number]]                            Final result                 Please view results for these tests on the individual orders.       Data reviewed today:  I personally reviewed no images or EKG's today.    No results found for this or any previous visit (from the past 24 hour(s)).

## 2021-09-14 NOTE — CONSULTS
"BRIEF NUTRITION ASSESSMENT      REASON FOR ASSESSMENT:  Susie Hernández is a 61 year old female seen by Registered Dietitian for Provider Order - Suspected severe protein calorie malnutrition     NUTRITION HISTORY:  Visited with patient at bedside this afternoon.  She states that she has not had any reduced appetite or intake prior to admit.  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  She notes that she had cereal and cinnamon raisin toast in the morning.  \"I eat three lite meals per day\".  She may have a turkey sandwich, pasta dishes, or salmon.  Patient notes that she has cut out coffee intake and has been really watching her sodium intake due to her leg edema.    Patient denies any weight loss, states weight has been stable.     CURRENT DIET AND INTAKE:  Diet:  Clear Liquid               Patient has not ordered yet today     ANTHROPOMETRICS:  Height: 5' 5\"  Weight:  65.8 kg (145#)(9/13)  Body mass index is 24.13 kg/m    Weight Status: Normal BMI  IBW:  56.8 kg   %IBW: 116%  Weight History:   Wt Readings from Last 10 Encounters:   09/13/21 65.8 kg (145 lb)     Patient denies weight loss     LABS:  Albumin 1.9 (L) - Albumin is not an evidence based indicator of nutrition status/malnutrition.  Albumin is a negative acute phase protein and will be low in states of inflammation.  Suspecting low albumin is due to blood loss with fungating breast mass rather than malnutrition.     MALNUTRITION:  Visual Nutrition Focused Physical Assessment (NFPA) completed - no muscle/fat losses noted.  Patient does not meet two of the following criteria necessary for diagnosing malnutrition.     % Weight Loss:  None noted  % Intake:  No decreased intake noted  Subcutaneous Fat Loss:  None observed  Muscle Loss:  None observed  Fluid Retention:  Mild 1-2+ in extremities per RN notes     NUTRITION INTERVENTION:  Nutrition Diagnosis:  No nutrition diagnosis at this time.    Implementation:  Nutrition Education:  Per Provider order if " indicated.  Offered patient an oral nutritional supplement to which she has declined at this time.    FOLLOW UP/MONITORING:   Will re-evaluate in 7 - 10 days, or sooner, if re-consulted.    Raisa Pimentel RD, LD, [facility]   Clinical Dietitian - [facility]

## 2021-09-14 NOTE — ED NOTES
[facility]  ED Nurse Handoff Report    ED Chief complaint: Leg Swelling      ED Diagnosis:   Final diagnoses:   Breast mass in female   Anemia, unspecified type       Code Status: Full Code, Admitting MD to confirm.      Allergies: No Known Allergies    Patient Story: Presents with leg swelling. Found to have open wound on her L breast that were hemorrhaging.  Focused Assessment:  Alert and oriented x4. In room air. 22G in R hand, 18G in R AC. 14G in LL arm. MD Opal, suturing bleeding sites on L breast at this time. Color improved after 1st unit of blood, no other syncope episodes after blood unit. Currently on the 3rd unit. Ambulated with a walker at baseline. Sinus tachycardic. Stable pressures.    Labs Ordered and Resulted from Time of ED Arrival Up to the Time of Departure from the ED   COMPREHENSIVE METABOLIC PANEL - Abnormal; Notable for the following components:       Result Value    Sodium 132 (*)     Glucose 152 (*)     Protein Total 6.5 (*)     Albumin 2.4 (*)     All other components within normal limits   CBC WITH PLATELETS AND DIFFERENTIAL - Abnormal; Notable for the following components:    RBC Count 2.63 (*)     Hemoglobin 4.7 (*)     Hematocrit 17.9 (*)     MCV 68 (*)     MCH 17.9 (*)     MCHC 26.3 (*)     RDW 16.3 (*)     Platelet Count 576 (*)     Absolute Lymphocytes 0.7 (*)     All other components within normal limits   INR - Abnormal; Notable for the following components:    INR 1.22 (*)     All other components within normal limits   LACTIC ACID WHOLE BLOOD - Normal   TROPONIN I - Normal   NT PROBNP INPATIENT - Normal   COVID-19 VIRUS (CORONAVIRUS) BY PCR - Normal    Narrative:     Testing was performed using the taryn  SARS-CoV-2 & Influenza A/B Assay on the taryn  Gisela  System.  This test should be ordered for the detection of SARS-COV-2 in individuals who meet SARS-CoV-2 clinical and/or epidemiological criteria. Test performance is unknown in asymptomatic patients.  This  test is for in vitro diagnostic use under the FDA EUA for laboratories certified under CLIA to perform moderate and/or high complexity testing. This test has not been FDA cleared or approved.  A negative test does not rule out the presence of PCR inhibitors in the specimen or target RNA in concentration below the limit of detection for the assay. The possibility of a false negative should be considered if the patient's recent exposure or clinical presentation suggests COVID-19.  [facility] are certified under the Clinical Laboratory Improvement Amendments of 1988 (CLIA-88) as qualified to perform moderate and/or high complexity laboratory testing.   CBC WITH PLATELETS & DIFFERENTIAL    Narrative:     The following orders were created for panel order CBC with platelets differential.  Procedure                               Abnormality         Status                     ---------                               -----------         ------                     CBC with platelets and d...[[id number]]  Abnormal            Final result                 Please view results for these tests on the individual orders.   TYPE AND SCREEN, ADULT   PREPARE RED BLOOD CELLS (UNIT)   PREPARE RED BLOOD CELLS (UNIT)   PREPARE RED BLOOD CELLS (UNIT)   PREPARE RED BLOOD CELLS (UNIT)   PREPARE RED BLOOD CELLS (UNIT)   PREPARE RED BLOOD CELLS (UNIT)   TRANSFUSE RED BLOOD CELLS (UNIT)   TRANSFUSE RED BLOOD CELLS (UNIT)   TRANSFUSE RED BLOOD CELLS (UNIT)   TRANSFUSE RED BLOOD CELLS (UNIT)   ABO/RH TYPE AND SCREEN    Narrative:     The following orders were created for panel order ABO/Rh type and screen.  Procedure                               Abnormality         Status                     ---------                               -----------         ------                     Adult Type and Screen[[id number]]                            Final result                 Please view results for these tests on the individual orders.      No orders to display         Treatments and/or interventions provided: Blood  Patient's response to treatments and/or interventions: Tolerated well    To be done/followed up on inpatient unit:  Continue with plan of care per admitting MD.    Does this patient have any cognitive concerns?: N/A    Activity level - Baseline/Home:  Walker  Activity Level - Current:   Total Care    Patient's Preferred language: English   Needed?: No    Isolation: None  Infection: Not Applicable  Patient tested for COVID 19 prior to admission: YES  Bariatric?: No    Vital Signs:   Vitals:    09/14/21 0030 09/14/21 0043 09/14/21 0045 09/14/21 0100   BP: 130/80 136/84 136/84    Pulse: 114 112 120 114   Resp: 8 12 8 26   Temp: 97.4  F (36.3  C) 97.3  F (36.3  C) 97.3  F (36.3  C) 97.4  F (36.3  C)   TempSrc: Temporal Temporal Temporal Temporal   SpO2: 100% 100% 100% 100%   Weight:       Height:           Cardiac Rhythm:     Was the PSS-3 completed:   Yes  What interventions are required if any?               Family Comments: No family at bedside  OBS brochure/video discussed/provided to patient/family: N/A                  For the majority of the shift this patient's behavior was Green.     ED NURSE PHONE NUMBER: [id number]

## 2021-09-14 NOTE — ED NOTES
Patient up walking in room.  Had a syncopal episode.  In bed & placed in reverse Trendelenburg.  Still restless.  /110.  EDT to go get units of blood from lab.  Dr Cuellar notified.  Patient moved to [facility].

## 2021-09-14 NOTE — PROGRESS NOTES
RECEIVING UNIT ED HANDOFF REVIEW    ED Nurse Handoff Report was reviewed by: Kayli Gutierrez RN on September 14, 2021 at 1:32 AM

## 2021-09-14 NOTE — PROGRESS NOTES
General Surgery Operative Note - Minors Procedure    Pre-Operative Diagnosis- Fungating left breast mass    Post-Operative Diagnosis- Same    Procedure- Left breast punch biopsy    Surgeon- Razia Godwin MD    Assistant- None    Anesthesia- 1% lidocaine with epi    Specimen- left breast (upper inner quadrant) punch biopsy    Procedure  Verbal consent was obtained. The patient was positioned supine. The patient's left upper inner breast was prepped with Chloraprep. Using sterile technique, 1% lidocaine with epinephrine was used to infiltrate the area. After adequate anesthesia was confirmed, an 8mm punch was used to obtain a tissue sample.  It was amputated utilizing the iris scissors.  The specimen was placed directly into formalin.  Minor bleeding was controlled using pressure.   The wound was reapproximated using interrupted 3-0 Prolene sutures.  A sterile dressing was applied.  Patient tolerated the procedure well without complications.       Razia Godwin MD  [facility]  [phone]

## 2021-09-14 NOTE — PROVIDER NOTIFICATION
Text page to Dr. Mcdermott    (pt moved rooms) Can pt have diet? CT says no NPO for scan. Also, pt has been up and moving, ok to keep doing so?

## 2021-09-14 NOTE — ED PROVIDER NOTES
History   Chief Complaint:  Leg Swelling     The history is provided by the patient.      Susie Hernández is a 61 year old female with history of restless leg syndrome who presents from Urgent Care with leg swelling. Patient reports that she has noticed increased leg swelling recently. States the swelling is worsening every day and has radiated into her abdomen. Endorses weakness and skin pallor when questioned as well. Of note, she has a history of restless leg syndrome in both legs.     Later in her ED stay she admits to a breast mass that is now a wound.  She has not seen a doctor for it and has been dressing it with cotton balls and pads and tape.    Review of Systems   Cardiovascular: Positive for leg swelling.   Gastrointestinal: Positive for abdominal distention.   Skin: Positive for pallor.   Neurological: Positive for weakness.   10 systems reviewed and negative except as above and in HPI.    Allergies:  No Known Allergies    Medications:  The patient is currently on no regular medications.    Past Medical History:    Restless leg syndrome    Social History:  Presents with    Patient is     Physical Exam     Patient Vitals for the past 24 hrs:   BP Temp Temp src Pulse Resp SpO2 Height Weight   09/14/21 0043 -- (P) 97.3  F (36.3  C) (P) Tympanic -- -- -- -- --   09/14/21 0030 130/80 97.4  F (36.3  C) Temporal 114 8 100 % -- --   09/14/21 0015 126/70 97.5  F (36.4  C) Temporal 112 13 100 % -- --   09/14/21 0010 -- -- -- 112 17 100 % -- --   09/14/21 0005 135/62 -- -- (!) 126 22 100 % -- --   09/14/21 0000 124/78 97.3  F (36.3  C) Temporal 109 11 100 % -- --   09/13/21 2355 115/69 -- -- 105 27 100 % -- --   09/13/21 2350 (!) 119/92 -- -- 108 20 100 % -- --   09/13/21 2349 -- 98.6  F (37  C) Temporal -- -- -- -- --   09/13/21 2346 -- 98.6  F (37  C) -- -- -- -- -- --   09/13/21 2345 120/69 -- -- 108 25 100 % -- --   09/13/21 2340 120/73 -- -- 99 18 100 % -- --   09/13/21 2335 115/79 -- -- 107 16  "100 % -- --   09/13/21 2330 (!) 107/96 -- -- 111 21 100 % -- --   09/13/21 2325 132/74 96.9  F (36.1  C) Temporal 112 15 100 % -- --   09/13/21 2320 103/74 -- -- 101 15 100 % -- --   09/13/21 2315 115/80 -- -- 99 14 -- -- --   09/13/21 2310 101/74 97.9  F (36.6  C) -- 98 23 100 % -- --   09/13/21 1547 113/70 97.6  F (36.4  C) Temporal 104 16 99 % 1.651 m (5' 5\") 65.8 kg (145 lb)       Physical Exam  General: Pacing in room, profoundly pale, appears uncomfortable  Head:  The scalp, face, and head appear normal  Mouth/Throat: Mucus membranes are moist  CV:  Regular rate    Normal S1 and S2  No pathological murmur   Resp:  Breath sounds clear and equal bilaterally    Non-labored, no retractions or accessory muscle use    No coarseness    No wheezing   GI:  Abdomen is soft, no rigidity    No tenderness to palpation  MS:  . Bilateral lower extremity edema with pitting edema to the mid abdomen.   Skin:  See images below.   Breast:           See images below.  Neuro:  Speech is normal and fluent. No apparent deficit.  Psych: Awake. Alert.  Normal affect.      Appropriate interactions.                  Emergency Department Course   Laboratory:  CBC: WBC 6.6, HGB 4.7 (LL),  (H)  CMP: Glucose 152 (H), Sodium: 132 (L), Albumin: 2.4 (L), Protein Total: 6.5 (L), o/w WNL (Creatinine: 0.59)    Troponin (Collected 2150): <0.015  Lactic acid (Resulted 2224): 1.2  BNP: 530  INR: 1.22 (H)    ABO/Rh Type and Screen: A Pos    Asymptomatic Covid: Pending    Emergency Department Course:    Reviewed:  I reviewed nursing notes, vitals and past medical history    Assessments:  2120 I obtained history and examined the patient as noted above.   2229 I rechecked the patient and explained findings.   2305    Patient moved to [facility] 3 and blood was hung. I received consent for a blood transfusion from the patient.    Consults:   2257 I consulted Dr. Ivrin, of the [facility], regarding the patient.   5002 I talked with Dr. Joseph, of surgery, " regarding the patient.     Interventions:  Transfuse red blood cells, 1 unit(s), IV  Transfuse red blood cells, 1 unit(s), IV  Transfuse red blood cells, 1 unit(s), IV  Transfuse red blood cells, 1 unit(s), IV    Disposition:  The patient was admitted to the hospital under the care of Dr. Irvin.     Impression & Plan   Medical Decision Making:  Susie Hernández is a 61 year old female who presents for evaluation of leg swelling and increased weakness. They are anemic on labs. A broad differential was of course considered, most probable etiologies include anemia of chronic disease, acute blood loss, slow or fast gastrointestinal bleeding, iron deficiency anemia, etc. Given later finding of a briskly bleeding left breast mass (refer to images above) on exam, I feel the most likely etiology of her anemia is acute blood loss, likely on top of anemia of chronic disease given her apparent malignancy.      Based on hgb, I recommended blood transfusion and admission to the hospital. They consent to this. Discussed with medicine team who accepted patient for admission.      While in the ED, the patient had a syncopal episode.  She was emergently transfused 3 units and a pressure dressing was applied ot her breast.  Dr. Joseph graciously came to the ED and evaluated with patient.  While the dressing slowed the bleeding, it has not resolved.  After removal of the dressing the patient had ongoing brisk bleeding.  Dr. Joseph placed several sutures in the stabilization room with good hemostasis.  Pressure dressing reapplied.    Critical care time  100 minutes exclusive of procedures.    Covid-19  Susie Hernández was evaluated during a global COVID-19 pandemic, which necessitated consideration that the patient might be at risk for infection with the SARS-CoV-2 virus that causes COVID-19.   Applicable protocols for evaluation were followed during the patient's care.   COVID-19 was considered as part of the patient's evaluation. The  plan for testing is:  a test was obtained during this visit.     Diagnosis:    ICD-10-CM    1. Breast mass in female  N63.0    2. Anemia, unspecified type  D64.9        Scribe Disclosure:  I, Leydi Heath, am serving as a scribe at 9:19 PM on 9/13/2021 to document services personally performed by Shawna Cuellar MD based on my observations and the provider's statements to me.            Shawna Cuellar MD  09/14/21 0144

## 2021-09-14 NOTE — PROVIDER NOTIFICATION
MD Notification    Notified Person: MD    Notified Person Name: Brandee    Notification Date/Time: 9/14/2021 0201    Notification Interaction: [employer]     Purpose of Notification:    Room 330-2 S.T.    pt. extremely restless.    Thanks,  Rhianna BRAVO    Orders Received:    Requip ordered    Comments:

## 2021-09-14 NOTE — PROVIDER NOTIFICATION
Text page to Dr. Mcdermott     Can pt have another dose of Requip for restless leg? Thank you!    PRN flexeril ordered

## 2021-09-15 ENCOUNTER — APPOINTMENT (OUTPATIENT)
Dept: NUCLEAR MEDICINE | Facility: CLINIC | Age: 61
End: 2021-09-15
Attending: INTERNAL MEDICINE
Payer: MEDICAID

## 2021-09-15 LAB
ALBUMIN SERPL-MCNC: 1.9 G/DL (ref 3.4–5)
ANION GAP SERPL CALCULATED.3IONS-SCNC: 6 MMOL/L (ref 3–14)
BASOPHILS # BLD AUTO: 0 10E3/UL (ref 0–0.2)
BASOPHILS NFR BLD AUTO: 1 %
BUN SERPL-MCNC: 7 MG/DL (ref 7–30)
CALCIUM SERPL-MCNC: 9.3 MG/DL (ref 8.5–10.1)
CHLORIDE BLD-SCNC: 110 MMOL/L (ref 94–109)
CO2 SERPL-SCNC: 22 MMOL/L (ref 20–32)
CREAT SERPL-MCNC: 0.51 MG/DL (ref 0.52–1.04)
EOSINOPHIL # BLD AUTO: 0.1 10E3/UL (ref 0–0.7)
EOSINOPHIL NFR BLD AUTO: 1 %
ERYTHROCYTE [DISTWIDTH] IN BLOOD BY AUTOMATED COUNT: 20.3 % (ref 10–15)
GFR SERPL CREATININE-BSD FRML MDRD: >90 ML/MIN/1.73M2
GLUCOSE BLD-MCNC: 79 MG/DL (ref 70–99)
HCT VFR BLD AUTO: 26 % (ref 35–47)
HGB BLD-MCNC: 7.3 G/DL (ref 11.7–15.7)
HGB BLD-MCNC: 8 G/DL (ref 11.7–15.7)
HGB BLD-MCNC: 8.2 G/DL (ref 11.7–15.7)
HGB BLD-MCNC: 8.2 G/DL (ref 11.7–15.7)
HGB BLD-MCNC: 8.3 G/DL (ref 11.7–15.7)
HGB BLD-MCNC: 8.4 G/DL (ref 11.7–15.7)
IMM GRANULOCYTES # BLD: 0 10E3/UL
IMM GRANULOCYTES NFR BLD: 1 %
LYMPHOCYTES # BLD AUTO: 0.6 10E3/UL (ref 0.8–5.3)
LYMPHOCYTES NFR BLD AUTO: 7 %
MCH RBC QN AUTO: 23.3 PG (ref 26.5–33)
MCHC RBC AUTO-ENTMCNC: 30.8 G/DL (ref 31.5–36.5)
MCV RBC AUTO: 76 FL (ref 78–100)
MONOCYTES # BLD AUTO: 1.3 10E3/UL (ref 0–1.3)
MONOCYTES NFR BLD AUTO: 15 %
NEUTROPHILS # BLD AUTO: 6.7 10E3/UL (ref 1.6–8.3)
NEUTROPHILS NFR BLD AUTO: 75 %
NRBC # BLD AUTO: 0 10E3/UL
NRBC BLD AUTO-RTO: 0 /100
PHOSPHATE SERPL-MCNC: 2.9 MG/DL (ref 2.5–4.5)
PLATELET # BLD AUTO: 429 10E3/UL (ref 150–450)
POTASSIUM BLD-SCNC: 3.9 MMOL/L (ref 3.4–5.3)
RBC # BLD AUTO: 3.43 10E6/UL (ref 3.8–5.2)
SODIUM SERPL-SCNC: 138 MMOL/L (ref 133–144)
WBC # BLD AUTO: 8.8 10E3/UL (ref 4–11)

## 2021-09-15 PROCEDURE — 250N000013 HC RX MED GY IP 250 OP 250 PS 637: Performed by: HOSPITALIST

## 2021-09-15 PROCEDURE — 99231 SBSQ HOSP IP/OBS SF/LOW 25: CPT | Performed by: SURGERY

## 2021-09-15 PROCEDURE — A9503 TC99M MEDRONATE: HCPCS | Performed by: HOSPITALIST

## 2021-09-15 PROCEDURE — 84100 ASSAY OF PHOSPHORUS: CPT | Performed by: HOSPITALIST

## 2021-09-15 PROCEDURE — 250N000013 HC RX MED GY IP 250 OP 250 PS 637: Performed by: INTERNAL MEDICINE

## 2021-09-15 PROCEDURE — 36415 COLL VENOUS BLD VENIPUNCTURE: CPT | Performed by: HOSPITALIST

## 2021-09-15 PROCEDURE — 85018 HEMOGLOBIN: CPT | Performed by: HOSPITALIST

## 2021-09-15 PROCEDURE — G0463 HOSPITAL OUTPT CLINIC VISIT: HCPCS

## 2021-09-15 PROCEDURE — 99232 SBSQ HOSP IP/OBS MODERATE 35: CPT | Performed by: INTERNAL MEDICINE

## 2021-09-15 PROCEDURE — 99233 SBSQ HOSP IP/OBS HIGH 50: CPT | Performed by: HOSPITALIST

## 2021-09-15 PROCEDURE — 78306 BONE IMAGING WHOLE BODY: CPT

## 2021-09-15 PROCEDURE — 82310 ASSAY OF CALCIUM: CPT | Performed by: HOSPITALIST

## 2021-09-15 PROCEDURE — 343N000001 HC RX 343: Performed by: HOSPITALIST

## 2021-09-15 PROCEDURE — 120N000001 HC R&B MED SURG/OB

## 2021-09-15 RX ORDER — FUROSEMIDE 20 MG
20 TABLET ORAL ONCE
Status: COMPLETED | OUTPATIENT
Start: 2021-09-15 | End: 2021-09-15

## 2021-09-15 RX ORDER — METRONIDAZOLE 500 MG/1
500 TABLET ORAL 2 TIMES DAILY
Status: DISCONTINUED | OUTPATIENT
Start: 2021-09-15 | End: 2021-09-16 | Stop reason: HOSPADM

## 2021-09-15 RX ORDER — OXYCODONE HYDROCHLORIDE 5 MG/1
5 TABLET ORAL EVERY 4 HOURS PRN
Status: DISCONTINUED | OUTPATIENT
Start: 2021-09-15 | End: 2021-09-16 | Stop reason: HOSPADM

## 2021-09-15 RX ORDER — TC 99M MEDRONATE 20 MG/10ML
25 INJECTION, POWDER, LYOPHILIZED, FOR SOLUTION INTRAVENOUS ONCE
Status: COMPLETED | OUTPATIENT
Start: 2021-09-15 | End: 2021-09-15

## 2021-09-15 RX ADMIN — OXYCODONE HYDROCHLORIDE 5 MG: 5 TABLET ORAL at 21:49

## 2021-09-15 RX ADMIN — FUROSEMIDE 20 MG: 20 TABLET ORAL at 16:39

## 2021-09-15 RX ADMIN — ACETAMINOPHEN 650 MG: 325 TABLET, FILM COATED ORAL at 08:35

## 2021-09-15 RX ADMIN — ACETAMINOPHEN 650 MG: 325 TABLET, FILM COATED ORAL at 21:49

## 2021-09-15 RX ADMIN — ACETAMINOPHEN 650 MG: 325 TABLET, FILM COATED ORAL at 14:40

## 2021-09-15 RX ADMIN — POTASSIUM & SODIUM PHOSPHATES POWDER PACK 280-160-250 MG 1 PACKET: 280-160-250 PACK at 08:35

## 2021-09-15 RX ADMIN — CYCLOBENZAPRINE 5 MG: 5 TABLET, FILM COATED ORAL at 08:35

## 2021-09-15 RX ADMIN — FERROUS SULFATE TAB 325 MG (65 MG ELEMENTAL FE) 325 MG: 325 (65 FE) TAB at 08:35

## 2021-09-15 RX ADMIN — METRONIDAZOLE 500 MG: 500 TABLET ORAL at 21:31

## 2021-09-15 RX ADMIN — MELATONIN TAB 3 MG 5 MG: 3 TAB at 21:57

## 2021-09-15 RX ADMIN — TC 99M MEDRONATE 27.9 MCI.: 20 INJECTION, POWDER, LYOPHILIZED, FOR SOLUTION INTRAVENOUS at 07:49

## 2021-09-15 RX ADMIN — CYCLOBENZAPRINE 5 MG: 5 TABLET, FILM COATED ORAL at 21:57

## 2021-09-15 ASSESSMENT — ACTIVITIES OF DAILY LIVING (ADL)
ADLS_ACUITY_SCORE: 17

## 2021-09-15 NOTE — CONSULTS
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 Remove old dressings and discard   2. Cleanse wound and periwound skin with normal saline (or showering)   3. Pat dry   4. Crush tablets of Flagyl and apply topically directly to wound bed   5. Cover with adaptic gauze (oil emulsion gauze, non-adherent gauze, different names for the same thing)   6. Then cover with ABD pads   7. Secure with x2 6\" ACE wraps          metroNIDAZOLE (FLAGYL) tablet 500 mg   [[id number]]  Order Details  Ordered Dose: 500 mg Route: Topical Frequency: 2 TIMES DAILY   Admin Dose: 500 mg      Scheduled Start Date/Time: 09/15/21 2100 End Date/Time: -- First Dose: As Scheduled      Admin Instructions:   Crush tablets, apply directly topically to fungating breast tumor BID with dressing changes        Indications of Use: fungating breast tumor       Orders Written  Recommended provider order: None, at this time and spoke to provider in person and received verbal order with readback for flagyl  WO Nurse follow-up plan: weekly  Nursing to notify the Provider(s) and re-consult the [facility] Nurse if wound(s) deteriorates or new skin concern.    Patient History  According to provider note(s):  \" 61-year-old female with large fungating left breast mass.  This is clinically consistent with breast cancer.  Biopsy was done yesterday.  Because of chronic oozing of blood, she also has severe iron deficiency anemia.\"    Objective Data  Containment of urine/stool: Continent of bladder and Continent of bowel    Active Diet Order:  Orders Placed This Encounter      Regular Diet Adult    Output:   I/O last 3 completed shifts:  In: 430   Out: 200 [Urine:200]    Risk " "Assessment:   Sensory Perception: 4-->no impairment  Moisture: 3-->occasionally moist  Activity: 3-->walks occasionally  Mobility: 3-->slightly limited  Nutrition: 3-->adequate  Friction and Shear: 3-->no apparent problem  Tyree Score: 19                          Labs: Recent Labs   Lab 09/15/21  1027 09/15/21  0640 09/15/21  0640 09/14/21  0930 09/13/21  2328   ALBUMIN  --   --  1.9*   < >  --    HGB 8.2*   < > 8.0*   < >  --    INR  --   --   --   --  1.22*   WBC  --   --  8.8   < >  --     < > = values in this interval not displayed.       Physical Exam  Skin inspection: focused left breast    Wound Location:  Left breast  Date of last photo:  Did not capture image with consult. Few pics in media from ED MD  Wound History: \"Patient reports mass on left breast ongoing for more than a year.\"  Measurements (length x width x depth, in cm):  General left breast in total   Wound Base: 100% fungating tumor  Tunneling: not probed due to inappropriate   Undermining: not probed due to inappropriate   Palpation of the wound bed: firm   Periwound skin: intact, denuded, indurated and purplish discoloration   Color: purple  Temperature: normal   Drainage: large   Description of drainage: serosanguinous  Odor: strong and offensive  Pain: no pain reported during consult     Interventions  Current support surface: Standard  Foam mattress  Current off-loading measures: Pillows  Visual inspection of wound(s) completed  Wound Care: done per plan of care with exception of application of flagyl due to ordered after consult   Supplies: floor stock, discussed with RN and discussed with patient  Education provided: plan of care, Moisture management and Hygiene  Discussed plan of care with Patient and Physician    Tricia GAY    "

## 2021-09-15 NOTE — PROGRESS NOTES
Hospitalist cross cover:  Page regarding  morning phosphorus level 2.2.    Plan:  -Replete phosphorus per protocol    MAYE Fernandez, CNP  Hospitalist Service, House Officer  [facility]     Text Page  Pager: [phone]

## 2021-09-15 NOTE — CONSULTS
"[facility] Nurse Inpatient Wound Assessment     Reason for consultation: Evaluate and treat \"breast\" \"fungating left breast mass\"       Attempted to meet with patient in person for consult. Unable due to patient off unit for procedure bone scan. Spoke to primary RN in person.   Will re-attempt consult at a later time/date.       Tricia GAY            "

## 2021-09-15 NOTE — PLAN OF CARE
[id number]: A&O x4, VSS on RA, Tele ST, C/O pain LL breast, prn tylenol given. L breast wound covered with abd pads, binder in place. UTV any drainage, odorous. Pt IND in room w/ walker. Hemoglobin being monitored; hgb 7.9 @2203. Biopsy pending. Plan for bone scan today.

## 2021-09-15 NOTE — PROGRESS NOTES
[facility]  Hospitalist Progress Note   09/15/2021          Assessment and Plan:       Susie Hernández is a 61 year old woman with restless leg syndrome admitted on 9/13/2021 with leg swelling and fatigue and found to have blood loss anemia      Severe symptomatic microcytic anemia secondary to bleeding from fungating breast mass.  Status post 4 units of blood transfusion.  Patient reported oozing of blood from her left breast fungating mass.  On admission tachycardic, hemoglobin 4.7.  Fecal occult blood testing negative.   CT abdomen and pelvis -no acute pathology.  Followed by general surgery, noted to have bleeding from skin edges of left breast fungating mass that was controlled with sutures.   Received 4 units of blood transfusion and hemoglobin has been stable around 8.     Held off anemia work-up as patient has already received blood transfusion.  [place] [facility] following, appreciate input.  Monitor hemoglobin levels in AM.  Transfuse if hemoglobin less than 7 or symptomatic -conditional order to transfuse placed.     Fungating left breast mass with left axillary lymphadenopathy, concerning for breast cancer.  Multifocal masses with mildly enlarged right axillary lymph node concerning for breast cancer  Patient reports mass on left breast ongoing for more than a year.  No history of cancer in family members.  No previous age-appropriate health maintenance.  Bone scan does not reveal any bony metastasis. There is focal increased activity in the posterolateral aspect of right sixth rib; this is nonspecific.  General surgery followed, underwent punch biopsy for tissue diagnosis on 9/14.   Follow pathology results.  [place] [facility] following, plan to follow-up in clinic to discuss pathology results.  Multiple questions for patient,  and daughter by the bedside answered..  WOC RN  following, Flagyl topical dressing.  Appreciate multidisciplinary team input.  As needed pain meds as  needed for pain.     Leg and abdominal distension from anemia, hypoalbuminemia  Hypoalbuminemia from poor oral intake.  Reports  bilateral lower extremity swelling for few weeks now.  Echo with left ventricular systolic function normal, no significant valve disease noted.  Right ventricular size and function normal.  Limited images due to pain on left breast during imaging.  Will administer 20 mg oral Lasix today.  Dietary supplements with Ensure.     Acute hyponatremia: corrected.  Presented with sodium of 132, this morning sodium 138.  Suspect due to hypovolemia, vs SIADH from malignancy.   Monitor closely for now.    RLS:   Started on trial of Requip [9/13], symptoms improving.  As needed Flexeril.    Social issues.  Care coordinator/social work consult requested, patient currently awaiting insurance.  Emphasized need to establish primary care, outpatient follow-up.       COVID-19 PCR negative on admission.  Not vaccinated for COVID-19, encouraged to consider vaccination.      DVT Prophylaxis: SCDs, ambulate.     Code Status: Full Code  Disposition: Expected discharge likely tomorrow pending uneventful stay overnight..     Discussed with patient, her daughter,  by the bedside, wound care RN, bedside RN  Total time greater than 35 minutes.  More than 60% of time spent in direct patient care, care coordination, patient/family counseling, and formalizing plan of care.       Miranda Mcdermott MD        Interval History:      Lying in bed.  Continues to complain of discomfort in her left breast area.    Denies any chest pain.  Denies any headache or dizziness.  No palpitations.  No nausea vomiting.  No blood in the stools or blood in the urine or any bleeding from the left breast side.  Telemetry sinus rhythm.       Physical Exam:        Physical Exam   Temp:  [97.8  F (36.6  C)] 97.8  F (36.6  C)  Pulse:  [94] 94  Resp:  [14-16] 16  BP: (103-119)/(64-70) 119/70  SpO2:  [96 %-97 %] 96 %    Admission Weight:  65.8 kg (145 lb)  Current Weight: 65.8 kg (145 lb)    PHYSICAL EXAM  GENERAL: Patient is in no distress. Alert and oriented.  HEART: Regular rate and rhythm. S1S2.  Technically difficult due to breast mass.  Chest wall left breast mass fungating, oozing discharge.  LUNGS:  Respirations unlabored  NEURO: Moving all extremities.  EXTREMITIES: No pedal edema.   SKIN: Warm, dry.   PSYCHIATRY Cooperative       Medications:          fentaNYL  50 mcg Intravenous Once     ferrous sulfate  325 mg Oral Daily     metroNIDAZOLE  500 mg Topical BID     sodium chloride (PF)  3 mL Intracatheter Q8H     sodium phosphate  9 mmol Intravenous Once     acetaminophen **OR** acetaminophen, bisacodyl, cyclobenzaprine, lidocaine 4%, lidocaine (buffered or not buffered), melatonin, naloxone **OR** naloxone **OR** naloxone **OR** naloxone, ondansetron **OR** ondansetron, oxyCODONE, polyethylene glycol, prochlorperazine **OR** prochlorperazine **OR** prochlorperazine, sodium chloride (PF)         Data:      All new lab and imaging data was reviewed.     Clinically Significant Risk Factors Present on Admission

## 2021-09-15 NOTE — PROGRESS NOTES
[facility]    General Surgery  Daily Note       Assessment and Plan:   Susie Hernández is a 61 year old female with fungating left breast mass.  Patient also noted to have right breast masses and bilateral axillary lymphadenopathy on CT scan.  No obvious evidence of distant metastatic disease on CT chest/abdomen/pelvis.    -Medical management per primary team  -Proceed with bone scan today, per oncology  -Outpatient follow-up with oncology  -Await results of breast biopsy  -General surgery to follow peripherally, please call with questions/concerns.        Interval History:   Patient remained stable.  Minimal pain in left breast overnight, following biopsy.  CT findings discussed today at bedside with the patient and her daughter.           Physical Exam:   Temp: 97.8  F (36.6  C) Temp src: Oral BP: 119/70 Pulse: 94   Resp: 14 SpO2: 96 % O2 Device: None (Room air)      I/O last 3 completed shifts:  In: 430   Out: 200 [Urine:200]      Constitutional: healthy, alert and no distress   Respiratory: Breathing unlabored  Breast: Left breast with large fungating mass, left breast biopsy site hemostatic      Data   Recent Labs   Lab 09/15/21  0640 09/15/21  0145 09/14/21  2203 09/14/21  1439 09/14/21  0930 09/13/21  2328 09/13/21  2150 09/13/21  2150   WBC 8.8  --   --   --  7.1  --   --  6.6   HGB 8.0* 8.2* 7.9*   < > 8.0*  --    < > 4.7*   MCV 76*  --   --   --  76*  --   --  68*     --   --   --  422  --   --  576*   INR  --   --   --   --   --  1.22*  --   --      --   --   --  135  --   --  132*   POTASSIUM 3.9  --   --   --  4.0  --   --  4.0   CHLORIDE 110*  --   --   --  108  --   --  103   CO2 22  --   --   --  22  --   --  22   BUN 7  --   --   --  10  --   --  10   CR 0.51*  --   --   --  0.46*  --   --  0.59   ANIONGAP 6  --   --   --  5  --   --  7   CHYNA 9.3  --   --   --  9.0  --   --  9.6   GLC 79  --   --   --  106*  --   --  152*   ALBUMIN 1.9*  --   --   --  1.9*  --     < > 2.4*   PROTTOTAL  --   --   --   --  5.1*  --   --  6.5*   BILITOTAL  --   --   --   --  1.1  --   --  0.3   ALKPHOS  --   --   --   --  65  --   --  83   ALT  --   --   --   --  17  --   --  21   AST  --   --   --   --  19  --   --  22   TROPONIN  --   --   --   --   --   --   --  <0.015    < > = values in this interval not displayed.     CT CHEST/ABDOMEN/PELVIS WITH CONTRAST 9/14/2021 3:03 PM                                                                   IMPRESSION:  1.  Large, fungating mass occupying the left breast with bulky left  axillary lymphadenopathy.  2.  Multiple small masses in the right breast with right breast skin  thickening and right axillary lymphadenopathy suspicious for  additional right breast cancer.  3.  No evidence of distant metastatic disease.    Razia Godwin MD

## 2021-09-15 NOTE — DISCHARGE INSTRUCTIONS
" Wound care Start: 09/15/21 1800, 2 TIMES DAILY, Routine     Comments: Location: left breast   Care: Twice daily dressing changes   1. Remove old dressings and discard   2. Cleanse wound and periwound skin with normal saline (or showering)   3. Pat dry   4. Crush tablets of Flagyl and apply topically directly to wound bed   5. Cover with adaptic gauze (oil emulsion gauze, non-adherent gauze, different names for the same thing)   6. Then cover with ABD pads   7. Secure with x2 6\" ACE wraps          "

## 2021-09-15 NOTE — PROGRESS NOTES
Service Date: 09/15/2021    SUBJECTIVE:  Mr. Hernández is a 61-year-old female with large fungating left breast mass.  This is clinically consistent with breast cancer.  Biopsy was done yesterday.  Because of chronic oozing of blood, she also has severe iron deficiency anemia.  She has received blood transfusion.    For staging workup, CT scan and bone scan was done.  CT scan does not reveal any evidence of distant metastatic disease.  Lungs are clear.  No mediastinal lymphadenopathy.  It reveals large left breast mass with left axillary lymphadenopathy.  There are also multifocal masses and skin thickening in the right breast with mildly enlarged right axillary lymph node.  Bone scan does not reveal any bone metastasis.  There is focal increased activity in the posterolateral aspect of right sixth rib; this is nonspecific.    Overall, her condition is stable.  She feels better because of transfusion.  Fatigue is less.  Her restless leg symptoms are better.    No headache or dizziness.  No chest pain or shortness of breath.  No nausea or vomiting.  No blood in the urine or stool.    ASSESSMENT:    1.  A 61-year-old female with fungating left breast mass clinically consistent with breast cancer.  2.  Multiple small masses and skin thickening of right breast.  She likely has right breast cancer also.  3.  Microcytic anemia secondary to bleeding from fungating breast mass.    PLAN:    1.  Scans were reviewed with the patient.  I explained to her there is no evidence of any metastatic disease.  She was happy to know that. As outpatient, we will plan on getting a PET scan.    2.  Breast biopsy has been done.  Results are awaited.  It will be available in the next few days.    I will see her in the clinic after the pathology is back.  Depending on the pathology and the ER/UT and HER-2/elia status, we will decide regarding treatment.    3.  The patient has a microcytic anemia.  Hemoglobin is stable.  She does not need any  further transfusion.  She has been started on oral iron, which she will continue.    4.  She had few questions, which were all answered.        TOTAL TIME SPENT:  30 minutes.  Time spent in today's visit, review of charts and investigations today and documentations.    Radha Sandoval MD        D: 09/15/2021   T: 09/15/2021   MT: ZHOU    Name:     ANNABELLE CONNELL  MRN:      [id number]        Account:      [id number]   :      1960           Service Date: 09/15/2021       Document: [id number]

## 2021-09-15 NOTE — PLAN OF CARE
9/15/21 [id number]  Patient alert and oriented, VSS on RA. Complains of pain in L breast, prn tylenol effective. L breast wound changed by WOC, binder in place. Serosanguinous drainage noted, odorous. Hematoma to LUE. Pt up independently. Monitoring hemoglobin Q4h, last result 8.2. Reg diet. Continent. Beside biopsy done yesterday, result pending. Bone scan completed today. Discharge pending back home tomorrow. Oncology signed off.

## 2021-09-16 VITALS
RESPIRATION RATE: 16 BRPM | HEIGHT: 65 IN | HEART RATE: 78 BPM | WEIGHT: 145 LBS | SYSTOLIC BLOOD PRESSURE: 103 MMHG | DIASTOLIC BLOOD PRESSURE: 56 MMHG | BODY MASS INDEX: 24.16 KG/M2 | TEMPERATURE: 97.8 F | OXYGEN SATURATION: 100 %

## 2021-09-16 LAB
ALBUMIN SERPL-MCNC: 1.8 G/DL (ref 3.4–5)
ALP SERPL-CCNC: 65 U/L (ref 40–150)
ALT SERPL W P-5'-P-CCNC: 15 U/L (ref 0–50)
ANION GAP SERPL CALCULATED.3IONS-SCNC: 7 MMOL/L (ref 3–14)
AST SERPL W P-5'-P-CCNC: 16 U/L (ref 0–45)
ATRIAL RATE - MUSE: 87 BPM
BASOPHILS # BLD AUTO: 0 10E3/UL (ref 0–0.2)
BASOPHILS NFR BLD AUTO: 1 %
BILIRUB SERPL-MCNC: 0.5 MG/DL (ref 0.2–1.3)
BUN SERPL-MCNC: 10 MG/DL (ref 7–30)
CALCIUM SERPL-MCNC: 8.5 MG/DL (ref 8.5–10.1)
CHLORIDE BLD-SCNC: 107 MMOL/L (ref 94–109)
CO2 SERPL-SCNC: 23 MMOL/L (ref 20–32)
CREAT SERPL-MCNC: 0.56 MG/DL (ref 0.52–1.04)
DIASTOLIC BLOOD PRESSURE - MUSE: NORMAL MMHG
EOSINOPHIL # BLD AUTO: 0.2 10E3/UL (ref 0–0.7)
EOSINOPHIL NFR BLD AUTO: 2 %
ERYTHROCYTE [DISTWIDTH] IN BLOOD BY AUTOMATED COUNT: 21.5 % (ref 10–15)
GFR SERPL CREATININE-BSD FRML MDRD: >90 ML/MIN/1.73M2
GLUCOSE BLD-MCNC: 81 MG/DL (ref 70–99)
HCT VFR BLD AUTO: 24.6 % (ref 35–47)
HGB BLD-MCNC: 7.2 G/DL (ref 11.7–15.7)
HGB BLD-MCNC: 7.4 G/DL (ref 11.7–15.7)
HGB BLD-MCNC: 8.1 G/DL (ref 11.7–15.7)
IMM GRANULOCYTES # BLD: 0.1 10E3/UL
IMM GRANULOCYTES NFR BLD: 1 %
INTERPRETATION ECG - MUSE: NORMAL
LYMPHOCYTES # BLD AUTO: 0.6 10E3/UL (ref 0.8–5.3)
LYMPHOCYTES NFR BLD AUTO: 7 %
MCH RBC QN AUTO: 22.9 PG (ref 26.5–33)
MCHC RBC AUTO-ENTMCNC: 29.3 G/DL (ref 31.5–36.5)
MCV RBC AUTO: 78 FL (ref 78–100)
MONOCYTES # BLD AUTO: 1.2 10E3/UL (ref 0–1.3)
MONOCYTES NFR BLD AUTO: 13 %
NEUTROPHILS # BLD AUTO: 6.7 10E3/UL (ref 1.6–8.3)
NEUTROPHILS NFR BLD AUTO: 76 %
NRBC # BLD AUTO: 0 10E3/UL
NRBC BLD AUTO-RTO: 0 /100
P AXIS - MUSE: 33 DEGREES
PHOSPHATE SERPL-MCNC: 3.6 MG/DL (ref 2.5–4.5)
PLATELET # BLD AUTO: 395 10E3/UL (ref 150–450)
POTASSIUM BLD-SCNC: 3.9 MMOL/L (ref 3.4–5.3)
PR INTERVAL - MUSE: 188 MS
PROT SERPL-MCNC: 5 G/DL (ref 6.8–8.8)
QRS DURATION - MUSE: 64 MS
QT - MUSE: 360 MS
QTC - MUSE: 433 MS
R AXIS - MUSE: -14 DEGREES
RBC # BLD AUTO: 3.14 10E6/UL (ref 3.8–5.2)
SODIUM SERPL-SCNC: 137 MMOL/L (ref 133–144)
SYSTOLIC BLOOD PRESSURE - MUSE: NORMAL MMHG
T AXIS - MUSE: 15 DEGREES
VENTRICULAR RATE- MUSE: 87 BPM
WBC # BLD AUTO: 8.8 10E3/UL (ref 4–11)

## 2021-09-16 PROCEDURE — 85018 HEMOGLOBIN: CPT | Performed by: HOSPITALIST

## 2021-09-16 PROCEDURE — 250N000013 HC RX MED GY IP 250 OP 250 PS 637: Performed by: HOSPITALIST

## 2021-09-16 PROCEDURE — 99239 HOSP IP/OBS DSCHRG MGMT >30: CPT | Performed by: HOSPITALIST

## 2021-09-16 PROCEDURE — 80053 COMPREHEN METABOLIC PANEL: CPT | Performed by: HOSPITALIST

## 2021-09-16 PROCEDURE — 84100 ASSAY OF PHOSPHORUS: CPT | Performed by: HOSPITALIST

## 2021-09-16 PROCEDURE — 36415 COLL VENOUS BLD VENIPUNCTURE: CPT | Performed by: HOSPITALIST

## 2021-09-16 PROCEDURE — 99231 SBSQ HOSP IP/OBS SF/LOW 25: CPT | Performed by: INTERNAL MEDICINE

## 2021-09-16 PROCEDURE — 250N000013 HC RX MED GY IP 250 OP 250 PS 637: Performed by: INTERNAL MEDICINE

## 2021-09-16 RX ORDER — METRONIDAZOLE 500 MG/1
500 TABLET ORAL 2 TIMES DAILY
Qty: 14 TABLET | Refills: 0 | Status: SHIPPED | OUTPATIENT
Start: 2021-09-16

## 2021-09-16 RX ORDER — FUROSEMIDE 20 MG
20 TABLET ORAL ONCE
Status: COMPLETED | OUTPATIENT
Start: 2021-09-16 | End: 2021-09-16

## 2021-09-16 RX ORDER — FERROUS SULFATE 325(65) MG
325 TABLET ORAL DAILY
Refills: 0 | COMMUNITY
Start: 2021-09-17

## 2021-09-16 RX ORDER — ACETAMINOPHEN 325 MG/1
650 TABLET ORAL EVERY 6 HOURS PRN
Refills: 0 | COMMUNITY
Start: 2021-09-16

## 2021-09-16 RX ORDER — OXYCODONE HYDROCHLORIDE 5 MG/1
5 TABLET ORAL EVERY 6 HOURS PRN
Qty: 12 TABLET | Refills: 0 | Status: SHIPPED | OUTPATIENT
Start: 2021-09-16 | End: 2021-09-19

## 2021-09-16 RX ADMIN — OXYCODONE HYDROCHLORIDE 5 MG: 5 TABLET ORAL at 12:43

## 2021-09-16 RX ADMIN — METRONIDAZOLE 500 MG: 500 TABLET ORAL at 15:06

## 2021-09-16 RX ADMIN — OXYCODONE HYDROCHLORIDE 5 MG: 5 TABLET ORAL at 17:41

## 2021-09-16 RX ADMIN — ACETAMINOPHEN 650 MG: 325 TABLET, FILM COATED ORAL at 12:43

## 2021-09-16 RX ADMIN — FERROUS SULFATE TAB 325 MG (65 MG ELEMENTAL FE) 325 MG: 325 (65 FE) TAB at 09:36

## 2021-09-16 RX ADMIN — OXYCODONE HYDROCHLORIDE 5 MG: 5 TABLET ORAL at 03:49

## 2021-09-16 RX ADMIN — ACETAMINOPHEN 650 MG: 325 TABLET, FILM COATED ORAL at 03:49

## 2021-09-16 RX ADMIN — FUROSEMIDE 20 MG: 20 TABLET ORAL at 11:11

## 2021-09-16 ASSESSMENT — ACTIVITIES OF DAILY LIVING (ADL)
ADLS_ACUITY_SCORE: 17
ADLS_ACUITY_SCORE: 17
ADLS_ACUITY_SCORE: 11
ADLS_ACUITY_SCORE: 11
ADLS_ACUITY_SCORE: 17

## 2021-09-16 NOTE — CONSULTS
61 y/0 female with large bleeding ulcerated left breast mass with anemia.  Pt S/P biopsy 9/14/21 - pathology pending.  I met with pt & discussed palliative XRT treatment to left breast mass.  Outlined potential 2.5 - 3 week course of XRT rx.  Pt is agreeable. Pt being considered for discharge from hospital today.  Will schedule simulation as outpt tomorrow AM.  I discussed with Dr. Sandoval.  SONAM Bolivar MD.

## 2021-09-16 NOTE — PLAN OF CARE
A&O x4, VSS. Pain controlled with prn oxy, tylenol and flexeril. HGB q4, 7.3, 7.4, 7.2. Dressing changed per orders to left breast. LS: clear, BS: audible. +void, -BM, Up IND.

## 2021-09-16 NOTE — DISCHARGE SUMMARY
Discharge Summary  Hospitalist    Date of Admission:  9/13/2021  Date of Discharge:  9/16/2021  Discharging Provider: Miranda Mcdermott MD    Primary Care Physician   [facility]  Primary Care Provider Phone Number: [phone]  Primary Care Provider Fax Number: [phone]    PRINCIPAL DIAGNOSIS  Severe symptomatic microcytic anemia secondary to bleeding from fungating breast mass.  Status post 4 units of blood transfusion.  Fungating left breast mass with left axillary lymphadenopathy, concerning for breast cancer.  Multifocal masses with mildly enlarged right axillary lymph node concerning for breast cancer  Leg and abdominal distension from anemia, hypoalbuminemia -improving.  Hypoalbuminemia from poor oral intake, underlying malignancy.  Coagulopathy  Acute hyponatremia: corrected.    Past Medical History:   Diagnosis Date     Restless legs syndrome (RLS)        History of Present Illness   Susie Hernández is an 61 year old female who presented with fatigue     Hospital Course   Susie Hernández is a 61 year old woman with restless leg syndrome admitted on 9/13/2021 with leg swelling and fatigue and found to have blood loss anemia      Severe symptomatic microcytic anemia secondary to bleeding from fungating breast mass.  Status post 4 units of blood transfusion.  Patient reported oozing of blood from her left breast fungating mass.  On admission tachycardic, hemoglobin 4.7.  Fecal occult blood testing negative.   CT abdomen and pelvis - no acute pathology.  Followed by general surgery, noted to have bleeding from skin edges of left breast fungating mass that was controlled with sutures.   Received 4 units of blood transfusion and hemoglobin has been stable around 8.     Held off anemia work-up as patient has already received blood transfusion.  [place] [facility]  followed, follow-up in clinic within a week to discuss pathology results.   Monitor hemoglobin level in 5 to 7 days or earlier if  symptomatic.     Fungating left breast mass with left axillary lymphadenopathy, concerning for breast cancer.  Multifocal masses with mildly enlarged right axillary lymph node concerning for breast cancer  Patient reports mass on left breast ongoing for more than a year. No history of cancer in family members. No previous age-appropriate health maintenance.  CT abdomen and pelvis no evidence of distant metastatic disease.  Bone scan does not reveal any bony metastasis. There is focal increased activity in the posterolateral aspect of right sixth rib.   General surgery followed, underwent punch biopsy for tissue diagnosis on 9/14.   Follow pathology results.  [place] [facility] followed, plan to follow-up in clinic to discuss pathology results.  [facility] RN  followed, Flagyl topical twice daily, wound care 2 times a day.  Tylenol as needed for pain, reserve oxycodone for moderate to severe pain.    Leg and abdominal distension from anemia, hypoalbuminemia -improving.  Hypoalbuminemia from poor oral intake, underlying malignancy.  Coagulopathy  Reports  bilateral lower extremity swelling for few weeks now.  INR 1.22, albumin 1.8, protein 5.0.  Echo with left ventricular systolic function normal, no significant valve disease noted.  Right ventricular size and function normal.  Limited images due to pain on left breast during imaging.  Had 2+ pitting radial edema on admission, received 2 doses of 20 mg oral Lasix after blood transfusion and symptoms improving.  Recommend limb elevation.  Follow CMP in 1 week.  Dietary supplements with Ensure.     Acute hyponatremia: corrected.  Presented with sodium of 132 >138.  Monitor BMP in 1 week.     RLS:   On pain meds as above.  Further follow-up as outpatient.     Social issues.  Care coordinator/social work consult requested, patient currently awaiting insurance.  Emphasized need to establish primary care, outpatient follow-up -with patient and her family.     COVID-19 PCR negative  on admission.  Not vaccinated for COVID-19, encouraged to consider vaccination.    Miranda Mcdermott MD.    Pending Results   These results will be followed up by oncology team   Unresulted Labs Ordered in the Past 30 Days of this Admission     Date and Time Order Name Status Description    9/14/2021  1:59 PM Surgical Pathology Exam In process              Physical Exam   Vitals:    09/13/21 1547   Weight: 65.8 kg (145 lb)     Vital Signs with Ranges  Temp:  [97.3  F (36.3  C)-98.1  F (36.7  C)] 97.9  F (36.6  C)  Pulse:  [76-91] 76  Resp:  [16] 16  BP: (103-109)/(53-62) 103/61  SpO2:  [97 %-99 %] 97 %  No intake/output data recorded.  PHYSICAL EXAM  GENERAL: Patient is in no distress. Alert and oriented.  HEART: Regular rate and rhythm. S1S2.  Technically difficult due to breast mass.  Chest wall left breast mass fungating, oozing discharge.  LUNGS:  Respirations unlabored.  Bilateral slightly decreased breath sounds.  No wheezing or crackles.  NEURO: Moving all extremities.  EXTREMITIES: 1+ pedal edema, edema improving   SKIN: Warm, dry.   PSYCHIATRY Cooperative  )Consultations This Hospital Stay   SURGERY GENERAL IP CONSULT  HEMATOLOGY & ONCOLOGY IP CONSULT  NUTRITION SERVICES ADULT IP CONSULT  CARE MANAGEMENT / SOCIAL WORK IP CONSULT  WOUND OSTOMY CONTINENCE NURSE  IP CONSULT  RADIATION ONCOLOGY IP CONSULT    Time Spent on this Encounter   IMiranda MD, personally saw the patient today and spent greater than 30 minutes discharging this patient.. Discussed with patient, bedside RN, Oncologist.  Patient's daughter,  were updated on 9/15 regarding discharge plan.    Discharge Disposition   Discharged to home  Condition at discharge: Stable    Discharge Orders      Reason for your hospital stay    You were admitted to the hospital with leg swelling and fatigue.  Noted to have oozing from fungating left breast mass.  Oozing controlled with sutures placed by general surgery, 4 units of blood  transfusion.  Also underwent biopsy of left breast, concerning for malignancy.  Followed by oncology during hospitalization.     Activity    Your activity upon discharge: activity as tolerated and no driving for today     Follow-up and recommended labs and tests     Follow up with primary care provider, Kenneth Rivera, within 7 days for hospital follow- up and to follow up on results.  The following labs/tests are recommended: Monitor CBC, CMP in 5 days or earlier if symptomatic.  Follow-up with oncology in 1 week, follow-up pathology results.    Age-appropriate health maintenance on provider visit.  Must establish primary care.  Consider vaccination for COVID-19.  Follow up in wound care clinic for left breast mass.     Discharge Instructions    Limb elevation.  Diet rich in protein, iron encouraged.  Twice Daily wound care as recommended by wound care team.     Diet    Follow this diet upon discharge: Orders Placed This Encounter      Snacks/Supplements Pediatric: Boost glucose control; Between Meals      Regular Diet Adult       Discharge Medications   Current Discharge Medication List      START taking these medications    Details   acetaminophen (TYLENOL) 325 MG tablet Take 2 tablets (650 mg) by mouth every 6 hours as needed for mild pain or other (and adjunct with moderate or severe pain or per patient request)  Refills: 0    Associated Diagnoses: Breast mass in female      ferrous sulfate (FEROSUL) 325 (65 Fe) MG tablet Take 1 tablet (325 mg) by mouth daily  Refills: 0    Associated Diagnoses: Anemia, unspecified type      metroNIDAZOLE (FLAGYL) 500 MG tablet Apply 1 tablet (500 mg) topically 2 times daily Crushed To left breast - as recommended by wound care team  Qty: 14 tablet, Refills: 0    Associated Diagnoses: Breast mass in female      oxyCODONE (ROXICODONE) 5 MG tablet Take 1 tablet (5 mg) by mouth every 6 hours as needed for moderate to severe pain  Qty: 12 tablet, Refills: 0    Associated  Diagnoses: Breast mass in female         STOP taking these medications       Ibuprofen-Acetaminophen (ADVIL DUAL ACTION) 125-250 MG TABS Comments:   Reason for Stopping:             Allergies   No Known Allergies    DATA    Most Recent 3 CBC's:Recent Labs   Lab Test 09/16/21  1353 09/16/21  0548 09/16/21  0202 09/15/21  1027 09/15/21  0640 09/14/21  1439 09/14/21  0930   WBC  --  8.8  --   --  8.8  --  7.1   HGB 8.1* 7.2* 7.4*   < > 8.0*   < > 8.0*   MCV  --  78  --   --  76*  --  76*   PLT  --  395  --   --  429  --  422    < > = values in this interval not displayed.      Most Recent 3 BMP's:  Recent Labs   Lab Test 09/16/21  0548 09/15/21  0640 09/14/21  0930    138 135   POTASSIUM 3.9 3.9 4.0   CHLORIDE 107 110* 108   CO2 23 22 22   BUN 10 7 10   CR 0.56 0.51* 0.46*   ANIONGAP 7 6 5   CHYNA 8.5 9.3 9.0   GLC 81 79 106*     Most Recent 2 LFT's:  Recent Labs   Lab Test 09/16/21  0548 09/14/21  0930   AST 16 19   ALT 15 17   ALKPHOS 65 65   BILITOTAL 0.5 1.1     Most Recent INR's and Anticoagulation Dosing History:  Anticoagulation Dose History     Recent Dosing and Labs Latest Ref Rng & Units 9/13/2021    INR 0.85 - 1.15 1.22(H)        Most Recent 3 Troponin's:  Recent Labs   Lab Test 09/13/21  2150   TROPONIN <0.015     Results for orders placed or performed during the hospital encounter of 09/13/21   NM Bone Scan Whole Body    Narrative    NM BONE SCAN WHOLE BODY  9/15/2021 12:47 PM    HISTORY: Left breast mass, breast cancer.    DOSE:  27.9mCi Tc99m MDP inj Rt hand @0750    COMPARISON:  Prior bone scan: None available.   Relevant imaging study: 9/14/2021 CT    FINDINGS: Diffuse bilateral calvarial increased activity which could  represent benign hyperostosis. Focal increased activity in the  posterolateral aspect of the right sixth rib correlating with a small  region of sclerosis on recent CT. Scattered increased activity in the  lumbar spine which is most likely degenerative. Normal renal uptake is  seen  bilaterally.      Impression    IMPRESSION:    1. Right rib focal posterolateral increased activity correlating with  a small region of bone sclerosis on recent CT. This is nonspecific due  to its small size. While this could be posttraumatic, small osseous  metastasis could have this appearance.  2. Bilateral diffuse calvarial increased activity which may represent  benign hyperostosis.  3. No other convincing evidence of osseous metastasis.    WILBER HINES MD         SYSTEM ID:  [id number]   CT Chest/Abdomen/Pelvis w Contrast    Narrative    CT CHEST/ABDOMEN/PELVIS WITH CONTRAST 9/14/2021 3:03 PM    CLINICAL HISTORY: Inflammatory breast cancer, initial workup.    TECHNIQUE: CT scan of the chest, abdomen, and pelvis was performed  following injection of IV contrast. Multiplanar reformats were  obtained. Dose reduction techniques were used.   CONTRAST:  73mL Isovue-370    COMPARISON: None.    FINDINGS:   LUNGS AND PLEURA: Trace pleural fluid bilaterally. The lungs are  clear.    MEDIASTINUM/AXILLAE: No mediastinal lymphadenopathy. Mild coronary  artery calcification. Large, fungating left breast mass. Left axillary  lymphadenopathy measures up to 7.5 cm. There are also multifocal  masses and skin thickening in the right breast, with mildly enlarged  right axillary lymph nodes.    HEPATOBILIARY: Normal.    PANCREAS: Normal.    SPLEEN: Normal.    ADRENAL GLANDS: Normal.    KIDNEYS/BLADDER: Normal.    BOWEL: Normal.    PELVIC ORGANS: Normal.    ADDITIONAL FINDINGS: None.    MUSCULOSKELETAL: Normal.      Impression    IMPRESSION:  1.  Large, fungating mass occupying the left breast with bulky left  axillary lymphadenopathy.  2.  Multiple small masses in the right breast with right breast skin  thickening and right axillary lymphadenopathy suspicious for  additional right breast cancer.  3.  No evidence of distant metastatic disease.    RADHA BRYAN MD         SYSTEM ID:  [id number]   Echo Limited     Value    LVEF   55-60%    [facility]    [id number]  [id number]  [id number]  [id number]^LISA^TERE^MARY     [facility]  Echocardiography Laboratory  [address] [place] [address]     Name: ANNABELLE CONNELL  MRN: [id number]  : 1960  Study Date: 2021 10:40 AM  Age: 61 yrs  Gender: Female  Patient Location: [facility]  Reason For Study: CHF  Ordering Physician: TERE GONZALEZ  Referring Physician: Kenneth Rivera  Performed By: Garry Talavera RDCS     BSA: 1.7 m2  Height: 65 in  Weight: 145 lb  HR: 84  BP: 123/89 mmHg  ______________________________________________________________________________  Procedure  Limited Portable Echo Adult. (Suboptimal images, abbreviated study performed).  Technically difficult study.  ______________________________________________________________________________  Interpretation Summary     Left ventricular systolic function is normal.  The right ventricle is normal in size and function.  No significant valve disease noted on limited views.     Technically difficult study due to pain on the left breast during imaging per  sonographer. Subcoastal views were optimal and most diagnostic info obtained  from those views.  ______________________________________________________________________________  Left Ventricle  The left ventricle is normal in size. There is concentric remodeling present.  Left ventricular systolic function is normal. The visual ejection fraction is  55-60%. Left ventricular diastolic function is not assessable. Regional wall  motion abnormalities cannot be excluded due to limited visualization.     Right Ventricle  The right ventricle is normal in size and function.     Atria  The left atrium is not well visualized. Right atrium not well visualized.     Mitral Valve  The mitral valve is normal in structure and function. There is trace mitral  regurgitation. There is no mitral valve stenosis.     Tricuspid Valve  The tricuspid valve is normal in  structure and function. Right ventricular  systolic pressure could not be approximated due to inadequate tricuspid  regurgitation. There is mild (1+) tricuspid regurgitation.     Aortic Valve  The aortic valve is trileaflet with aortic valve sclerosis. No aortic  regurgitation is present. No aortic stenosis is present.     Pulmonic Valve  The pulmonic valve is not well visualized.     Vessels  The aortic root is not well visualized. IVC diameter and respiratory changes  fall into an intermediate range suggesting an RA pressure of 8 mmHg.     Pericardium  There is no pericardial effusion.     ______________________________________________________________________________  MMode/2D Measurements & Calculations  IVSd: 0.98 cm  LVIDd: 4.6 cm  LVIDs: 3.1 cm  LVPWd: 1.0 cm  FS: 31.0 %  LV mass(C)d: 159.9 grams  LV mass(C)dI: 92.7 grams/m2     Ao root diam: 3.7 cm  LVOT diam: 2.3 cm  LVOT area: 4.0 cm2  RWT: 0.46     ______________________________________________________________________________  Report approved by: Vaishali Aleman 09/14/2021 11:26 AM

## 2021-09-16 NOTE — PLAN OF CARE
Pt here with L breast mass. A&O. L breast mass dressing changed - moderate serosanguinous/purulent drainage. VSS. regular diet. Up with ad mehrdad independent. C/o moderate to severe pain, decreased with oxycodone. Pt scoring green on the Aggression Stop Light Tool. Discharged home with daughter assist -  to drive. AVS reviewed, all questions answered. Sent home with AVS, meds & all belongings.

## 2021-09-17 ENCOUNTER — TRANSFERRED RECORDS (OUTPATIENT)
Dept: HEALTH INFORMATION MANAGEMENT | Facility: CLINIC | Age: 61
End: 2021-09-17

## 2021-09-17 LAB
PATH REPORT.COMMENTS IMP SPEC: NORMAL
PATH REPORT.FINAL DX SPEC: NORMAL
PATH REPORT.GROSS SPEC: NORMAL
PATH REPORT.MICROSCOPIC SPEC OTHER STN: NORMAL
PATH REPORT.RELEVANT HX SPEC: NORMAL
PATHOLOGY SYNOPTIC REPORT: NORMAL
PHOTO IMAGE: NORMAL

## 2021-09-17 NOTE — PROGRESS NOTES
Service Date: 2021    SUBJECTIVE:  Ms. Hernández is a 61-year-old female with large left breast fungating mass clinically consistent with breast cancer.  She also has right breast masses.  Left breast mass biopsy result is pending.  She did have multiple imaging studies done.  No evidence of metastatic disease.    The patient has oozing of blood from her breast.  Because of that, she has anemia.  The patient has received blood transfusion.  She continues to have some oozing of blood.  Wound Care is seeing the patient.    Overall, she is stable.  Mild fatigue.  No headache or dizziness.  No chest pain or shortness of breath.  No nausea or vomiting.  No bleeding from any other site.    All other review of systems negative.    PHYSICAL EXAMINATION:    GENERAL:  She is alert, oriented x3.  VITAL SIGNS:  Reviewed.  The rest of the systems not examined.    LABORATORY DATA:  Reviewed.    ASSESSMENT:    1.  A 61-year-old female with left breast fungating mass clinically consistent with breast cancer.  2.  Right breast masses.  This is likely breast cancer.  3.  Anemia due to bleeding from ulcerated left breast mass.    PLAN:     1.  Her surgical path is still pending.  Hopefully, it will be back in the next few days.  2.  The patient continues to have bleeding/oozing of blood from the breast mass.  Because of that, her hemoglobin has decreased.  I discussed with her regarding palliative radiation to the breast.  She is agreeable for it.  We will get a Radiation Oncology consult. Discussed with Dr. Bolivar from radiation.  3.  Case discussed with Dr. Jean.  The patient will be discharged.  We will see her in clinic for followup.    Radha Sandoval MD        D: 2021   T: 2021   MT: [id number]    Name:     ANNABELLE HERNÁNDEZ  MRN:      [id number]        Account:      [id number]   :      1960           Service Date: 2021       Document: [id number]

## 2021-09-21 ENCOUNTER — ONCOLOGY VISIT (OUTPATIENT)
Dept: ONCOLOGY | Facility: CLINIC | Age: 61
End: 2021-09-21
Attending: INTERNAL MEDICINE
Payer: MEDICAID

## 2021-09-21 VITALS
RESPIRATION RATE: 16 BRPM | BODY MASS INDEX: 24.93 KG/M2 | DIASTOLIC BLOOD PRESSURE: 70 MMHG | TEMPERATURE: 98.3 F | HEART RATE: 94 BPM | SYSTOLIC BLOOD PRESSURE: 120 MMHG | OXYGEN SATURATION: 100 % | WEIGHT: 149.8 LBS

## 2021-09-21 DIAGNOSIS — Z17.0 MALIGNANT NEOPLASM OF OVERLAPPING SITES OF LEFT BREAST IN FEMALE, ESTROGEN RECEPTOR POSITIVE (H): Primary | ICD-10-CM

## 2021-09-21 DIAGNOSIS — D50.9 IRON DEFICIENCY ANEMIA, UNSPECIFIED IRON DEFICIENCY ANEMIA TYPE: ICD-10-CM

## 2021-09-21 DIAGNOSIS — G89.3 CANCER ASSOCIATED PAIN: ICD-10-CM

## 2021-09-21 DIAGNOSIS — C50.812 MALIGNANT NEOPLASM OF OVERLAPPING SITES OF LEFT BREAST IN FEMALE, ESTROGEN RECEPTOR POSITIVE (H): Primary | ICD-10-CM

## 2021-09-21 DIAGNOSIS — N63.10 BREAST MASS, RIGHT: ICD-10-CM

## 2021-09-21 LAB
CANCER AG27-29 SERPL-ACNC: 215 U/ML (ref 0–39)
ERYTHROCYTE [DISTWIDTH] IN BLOOD BY AUTOMATED COUNT: 23.3 % (ref 10–15)
HCT VFR BLD AUTO: 27.3 % (ref 35–47)
HGB BLD-MCNC: 8.1 G/DL (ref 11.7–15.7)
MCH RBC QN AUTO: 24 PG (ref 26.5–33)
MCHC RBC AUTO-ENTMCNC: 29.7 G/DL (ref 31.5–36.5)
MCV RBC AUTO: 81 FL (ref 78–100)
PLATELET # BLD AUTO: 543 10E3/UL (ref 150–450)
RBC # BLD AUTO: 3.38 10E6/UL (ref 3.8–5.2)
WBC # BLD AUTO: 6.2 10E3/UL (ref 4–11)

## 2021-09-21 PROCEDURE — G0463 HOSPITAL OUTPT CLINIC VISIT: HCPCS

## 2021-09-21 PROCEDURE — 85027 COMPLETE CBC AUTOMATED: CPT | Performed by: INTERNAL MEDICINE

## 2021-09-21 PROCEDURE — 86300 IMMUNOASSAY TUMOR CA 15-3: CPT | Performed by: INTERNAL MEDICINE

## 2021-09-21 PROCEDURE — 36415 COLL VENOUS BLD VENIPUNCTURE: CPT | Performed by: INTERNAL MEDICINE

## 2021-09-21 PROCEDURE — 99215 OFFICE O/P EST HI 40 MIN: CPT | Performed by: INTERNAL MEDICINE

## 2021-09-21 RX ORDER — OXYCODONE HYDROCHLORIDE 5 MG/1
5 TABLET ORAL EVERY 6 HOURS PRN
Qty: 60 TABLET | Refills: 0 | Status: SHIPPED | OUTPATIENT
Start: 2021-09-21

## 2021-09-21 RX ORDER — OXYCODONE HYDROCHLORIDE 5 MG/1
5 TABLET ORAL EVERY 6 HOURS PRN
COMMUNITY
End: 2021-09-21

## 2021-09-21 ASSESSMENT — PAIN SCALES - GENERAL: PAINLEVEL: WORST PAIN (10)

## 2021-09-21 NOTE — PATIENT INSTRUCTIONS
1. PET scan soon.  2. Right breast mammogram.  3. EGD and colonoscopy.  4. See me after PET scan.  5. Labs today. Labs drawn today. SLS.   6. Oxycodone as needed.  7. Continue radiation.   n/a

## 2021-09-21 NOTE — LETTER
9/21/2021         RE: Susie Hernández  [address]  [facility] [address]        Dear Colleague,    Thank you for referring your patient, Susie Hernández, to the [facility]. Please see a copy of my visit note below.    ONCOLOGY HISTORY:  Ms. Hernández is a female with breast cancer. ER positive and NH weakly positive.  1.  On 09/13/2021:  -Hemoglobin of 4.7 with MCV of 68. Normal WBC and platelet.  -Normal calcium and LFT.  2.  CT chest, abdomen, and pelvis on 09/14/2021 reveals a large left breast fungating mass with bulky left axillary lymphadenopathy.  There are multiple small masses in the right breast and right axillary lymphadenopathy.  3.  Bone scan on 09/15/2021 does not reveal any bone metastases.  4.  Left breast biopsy on 09/14/2021 reveals grade 1 invasive ductal carcinoma. ER strongly positive andPR weakly positive.     SUBJECTIVE:  Ms. Hernández is a 61-year-old female with newly diagnosed breast cancer.  Breast biopsy reveals grade 1 invasive ductal carcinoma, ER strongly positive. HER-2/elia is pending.     The patient has been having some bleeding from her fungating left breast mass causing anemia.  The patient has been started on radiation.     She feels tired.  No headache.  Some dizziness.  No chest pain.  No shortness of breath.  On walking, she gets palpitations.  No abdominal pain, nausea or vomiting.  No bone pain.     She has restless leg syndrome.  Her leg symptoms have gotten worse recently.     All other review of systems negative.     PHYSICAL EXAMINATION:    GENERAL:  She is alert and oriented x3.  VITAL SIGNS:  Reviewed.  Not in any distress.   The rest of the systems not examined.     LABORATORY DATA:  CBC was done today.  Hemoglobin is 8.1.  Platelet elevated from anemia.     ASSESSMENT:    1.  A 61-year-old female with fungating left breast invasive ductal carcinoma. ER positive. HER-2/elia pending.  2.  Right breast mass is suspicious for malignancy.  3.   Microcytic anemia secondary to blood loss from ulcerative breast mass.  4.  Thrombocytosis, reactive.  5.  Restless leg syndrome symptoms worse because of anemia.     PLAN:     1.  I had a long discussion with the patient.  Biopsy report was discussed with her.  I explained to her it is consistent with breast cancer. ER positive, HER-2/elia is pending.  The scans were all reviewed with her.  No evidence of metastatic disease.  2.  Discussed regarding further workup.  We will get a PET scan.  She is agreeable for it.  3.  Discussed regarding treatment.  She is currently getting radiation to the breast, which will be continued.  Treatment decision will be made after HER-2 is back.  If HER-2 is positive, she will be started on chemotherapy along with Herceptin and pertuzumab.  If HER-2 is negative, then we will start her on hormonal treatment.  The patient had multiple questions regarding breast, which were all discussed.  Further discussion regarding treatment after HER-2 is back.  4.  The patient has severe anemia.  This is mainly from bleeding from breast cancer.  I do want to rule out GI bleed.  We will get an EGD and colonoscopy.  She is agreeable for it.  5.  The patient does have right breast masses.  We will get a mammogram and after that biopsy.  6.  For left breast pain, she is taking oxycodone, which she will continue.  Side effects including constipation and sedation reviewed.  7.  She had a few questions, which were all answered.  I will see her after the PET scan.  I advised her to call us with any questions or concerns.    ADDENDUM:   -Ratio of HER2/APOORVA-17 signals: 1.2 (TORIE Negative)       -Case discussed in tumor board on 09/29/21. Consensus is to treat with hormonal treatment. No surgery.  -Will discuss AI or faslodex with CDK4/6 inhibitor.     TOTAL FACE-TO-FACE TIME SPENT:  45 minutes, more than 50% of the time was spent in counseling and coordination of care.    This office note has been  dictated.          Again, thank you for allowing me to participate in the care of your patient.        Sincerely,        Radha Sandoval MD

## 2021-09-22 ENCOUNTER — PATIENT OUTREACH (OUTPATIENT)
Dept: ONCOLOGY | Facility: CLINIC | Age: 61
End: 2021-09-22

## 2021-09-22 ENCOUNTER — TELEPHONE (OUTPATIENT)
Dept: WOUND CARE | Facility: CLINIC | Age: 61
End: 2021-09-22

## 2021-09-22 DIAGNOSIS — N63.0 BREAST MASS IN FEMALE: Primary | ICD-10-CM

## 2021-09-22 NOTE — PROGRESS NOTES
received a call from, Isidra BRAVO, of Dr. Bolivar concerned about the management of patient's breast wound has become significantly worse with odor and drainage and is recommending would care.    Writer has placed an order for wound care and homecare services, but patient is not homebound.     Will continue to monitor patient.    Ro Joy RN

## 2021-09-22 NOTE — TELEPHONE ENCOUNTER
New referral received via the workqueue from Dr. Sandoval for a radiated breast wound with new odor and increased drainage. Call placed to pt to schedule an appointment. The pt is not interested in making the appt at this time as she was not aware of the consult and does not feel a wound care provider is needed at this time. Call placed to Vanessa Joy RN who placed the consult. She discusses she feels the pt needs to see a wound care provider and will call the pt to discuss. Per Vanessa's request the pt was scheduled for 9/24 with Linette TORRES and she will call our clinic to confirm or cancel that appt.

## 2021-09-22 NOTE — PROGRESS NOTES
Dereckr received a call from wound care clinic that patient declined making an appointment as she did not feel she needed the services.     Writer called patient and LVM encouraging patietn that we need to make sure that we can promote good wound healing as much as possible and have correct dressings applied and covered by insurance.    Writer requested a return call.     Ro Joy RN

## 2021-09-28 LAB — INTERPRETATION: NORMAL

## 2021-09-29 ENCOUNTER — TELEPHONE (OUTPATIENT)
Dept: ONCOLOGY | Facility: CLINIC | Age: 61
End: 2021-09-29

## 2021-09-29 ENCOUNTER — PATIENT OUTREACH (OUTPATIENT)
Dept: ONCOLOGY | Facility: CLINIC | Age: 61
End: 2021-09-29

## 2021-09-29 NOTE — PROGRESS NOTES
Writer called to assist with scheduling and per message from Dr. Sandoval HER2 result is back.    Writer LVM requesting a return call.     Ro Joy RN

## 2021-09-29 NOTE — TELEPHONE ENCOUNTER
SHELLY Mendez left voicemail for patient to return call to Vanessa and to schedule appointment.     ----- Message from Radha Sandoval MD sent at 9/29/2021  7:42 AM CDT -----  Schedule her to see me soon to discuss about treatment. Her2 /elia is back.    bk

## 2021-09-30 ENCOUNTER — TELEPHONE (OUTPATIENT)
Dept: WOUND CARE | Facility: CLINIC | Age: 61
End: 2021-09-30

## 2021-09-30 NOTE — TELEPHONE ENCOUNTER
Call returned to pt. Pt had an appointment last week that she did not show up for. Pt has previously been called and has not wanted to establish care at [facility]. Discussed with pt that the first available appointment is October 27th. The pt did not want to schedule an appointment.

## 2021-09-30 NOTE — TELEPHONE ENCOUNTER
[facility] Wound    Who is the name of the provider?:  Kellen      What is the location you see this provider at?: [facility]    Reason for call:  Please call to schedule return appt per radiation provider, to make sure she is dressing wound properly.     Can we leave a detailed message on this number?  YES

## 2021-10-01 ENCOUNTER — PATIENT OUTREACH (OUTPATIENT)
Dept: ONCOLOGY | Facility: CLINIC | Age: 61
End: 2021-10-01

## 2021-10-01 NOTE — PROGRESS NOTES
Writer received a call from Darcy [place] [facility] [phone] that patient was possibly requesting to possibly transfer care. Darcy was concerned that calls were not returned. I informed Darcy that we are experiencing the same thing of not returning calls.     I called [facility] and spoke with Nandini that akila indeed has been coming to her radiation appointments and her last radiation is 10/8.     Writer called and spoke with patient and she informed me that she is aware an appointment that Darcy ARNETT set up at [facility] for a consult and that once she is done with that appointment she will make some decisions and will call us.  I informed patient that Dr. Sandoval has ordered PETSCAN and follow up to review that and HER2 results.    Patient at this time wants to wait for follow up and will call next week. . I informed her that I would call Thursday or Friday if I don't hear form her.    Patient verbalized understanding of the above.    Ro Joy RN

## 2021-10-02 NOTE — PROGRESS NOTES
ONCOLOGY HISTORY:  Ms. Hernández is a female with breast cancer. ER positive and UT weakly positive.  1.  On 09/13/2021:  -Hemoglobin of 4.7 with MCV of 68. Normal WBC and platelet.  -Normal calcium and LFT.  2.  CT chest, abdomen, and pelvis on 09/14/2021 reveals a large left breast fungating mass with bulky left axillary lymphadenopathy.  There are multiple small masses in the right breast and right axillary lymphadenopathy.  3.  Bone scan on 09/15/2021 does not reveal any bone metastases.  4.  Left breast biopsy on 09/14/2021 reveals grade 1 invasive ductal carcinoma. ER strongly positive andPR weakly positive.     SUBJECTIVE:  Ms. Hernández is a 61-year-old female with newly diagnosed breast cancer.  Breast biopsy reveals grade 1 invasive ductal carcinoma, ER strongly positive. HER-2/elia is pending.     The patient has been having some bleeding from her fungating left breast mass causing anemia.  The patient has been started on radiation.     She feels tired.  No headache.  Some dizziness.  No chest pain.  No shortness of breath.  On walking, she gets palpitations.  No abdominal pain, nausea or vomiting.  No bone pain.     She has restless leg syndrome.  Her leg symptoms have gotten worse recently.     All other review of systems negative.     PHYSICAL EXAMINATION:    GENERAL:  She is alert and oriented x3.  VITAL SIGNS:  Reviewed.  Not in any distress.   The rest of the systems not examined.     LABORATORY DATA:  CBC was done today.  Hemoglobin is 8.1.  Platelet elevated from anemia.     ASSESSMENT:    1.  A 61-year-old female with fungating left breast invasive ductal carcinoma. ER positive. HER-2/elia pending.  2.  Right breast mass is suspicious for malignancy.  3.  Microcytic anemia secondary to blood loss from ulcerative breast mass.  4.  Thrombocytosis, reactive.  5.  Restless leg syndrome symptoms worse because of anemia.     PLAN:     1.  I had a long discussion with the patient.  Biopsy report was discussed  with her.  I explained to her it is consistent with breast cancer. ER positive, HER-2/elia is pending.  The scans were all reviewed with her.  No evidence of metastatic disease.  2.  Discussed regarding further workup.  We will get a PET scan.  She is agreeable for it.  3.  Discussed regarding treatment.  She is currently getting radiation to the breast, which will be continued.  Treatment decision will be made after HER-2 is back.  If HER-2 is positive, she will be started on chemotherapy along with Herceptin and pertuzumab.  If HER-2 is negative, then we will start her on hormonal treatment.  The patient had multiple questions regarding breast, which were all discussed.  Further discussion regarding treatment after HER-2 is back.  4.  The patient has severe anemia.  This is mainly from bleeding from breast cancer.  I do want to rule out GI bleed.  We will get an EGD and colonoscopy.  She is agreeable for it.  5.  The patient does have right breast masses.  We will get a mammogram and after that biopsy.  6.  For left breast pain, she is taking oxycodone, which she will continue.  Side effects including constipation and sedation reviewed.  7.  She had a few questions, which were all answered.  I will see her after the PET scan.  I advised her to call us with any questions or concerns.    ADDENDUM:   -Ratio of HER2/APOORVA-17 signals: 1.2 (TORIE Negative)       -Case discussed in tumor board on 09/29/21. Consensus is to treat with hormonal treatment. No surgery.  -Will discuss AI or faslodex with CDK4/6 inhibitor.     TOTAL FACE-TO-FACE TIME SPENT:  45 minutes, more than 50% of the time was spent in counseling and coordination of care.

## 2021-10-06 ENCOUNTER — PATIENT OUTREACH (OUTPATIENT)
Dept: ONCOLOGY | Facility: CLINIC | Age: 61
End: 2021-10-06

## 2021-10-06 NOTE — PROGRESS NOTES
Writer spoke with Dr. Sandoval who advised that patient be added onto his schedule this week to coordinate with her radiation appointment to discuss further of results.     Writer called and spoke with patient and she does not want to make any appointments right now with Dr. Sandoval. She verbalizes that next week she will decide how to go forward after her appointment with Dr. DMITRY Miles with [facility] next week 10/13. Patient also was informed that her radiation has been extended to next Wednesday as well 10/13.    Writer will update Dr. Sandoval.    Ro Joy RN

## 2021-10-11 ENCOUNTER — TELEPHONE (OUTPATIENT)
Dept: ONCOLOGY | Facility: CLINIC | Age: 61
End: 2021-10-11
Payer: MEDICAID

## 2021-10-11 NOTE — TELEPHONE ENCOUNTER
[facility] calling re: metronidazole refill which was denied.     Wondering if Dr. Sandoval will refill or if patient needs to reach out to another provider. Was filled at discharge 9/16. Per chart review, patient was to follow-up with Wound Care, but did not show up to visit.    Will route to care team to review/advise and follow-up with patient as needed.     Lynne Keenan, MAIAN, RN, PHN, OCN  Oncology Care Coordinator  [facility]

## 2021-10-12 ENCOUNTER — TRANSFERRED RECORDS (OUTPATIENT)
Dept: HEALTH INFORMATION MANAGEMENT | Facility: CLINIC | Age: 61
End: 2021-10-12

## 2021-10-13 ENCOUNTER — PATIENT OUTREACH (OUTPATIENT)
Dept: ONCOLOGY | Facility: CLINIC | Age: 61
End: 2021-10-13

## 2021-10-13 NOTE — PROGRESS NOTES
Writer called patient today and LVM requesting a return call to follow up on patient's decision on plan of care going forward.     Will wait for a return call.    Ro Joy RN

## 2021-10-19 ENCOUNTER — PATIENT OUTREACH (OUTPATIENT)
Dept: ONCOLOGY | Facility: CLINIC | Age: 61
End: 2021-10-19

## 2021-10-19 NOTE — PROGRESS NOTES
Writer called patient and LVM requesting a return call regarding follow up care of her breast cancer.     Will try again in a few days.    Ro Joy RN

## 2021-10-25 NOTE — PROGRESS NOTES
Patient is receiving care at [facility] noted per CE.     Writer will no longer notify patient as she is receiving Oncologic care.     Ro Joy RN